# Patient Record
Sex: MALE | Race: WHITE | Employment: FULL TIME | URBAN - METROPOLITAN AREA
[De-identification: names, ages, dates, MRNs, and addresses within clinical notes are randomized per-mention and may not be internally consistent; named-entity substitution may affect disease eponyms.]

---

## 2017-08-21 RX ORDER — SIMVASTATIN 80 MG/1
TABLET, FILM COATED ORAL
Qty: 90 TAB | Refills: 2 | Status: SHIPPED | OUTPATIENT
Start: 2017-08-21 | End: 2018-04-03 | Stop reason: CLARIF

## 2017-12-20 PROBLEM — E78.2 MIXED HYPERLIPIDEMIA: Status: ACTIVE | Noted: 2017-12-20

## 2017-12-21 PROBLEM — I70.90 ASVD (ARTERIOSCLEROTIC VASCULAR DISEASE): Status: ACTIVE | Noted: 2017-12-21

## 2017-12-21 PROBLEM — R73.02 GLUCOSE INTOLERANCE (IMPAIRED GLUCOSE TOLERANCE): Status: ACTIVE | Noted: 2017-12-21

## 2017-12-21 PROBLEM — M19.90 DJD (DEGENERATIVE JOINT DISEASE): Status: ACTIVE | Noted: 2017-12-21

## 2017-12-21 PROBLEM — M13.0 ARTHRITIS OF MULTIPLE SITES: Status: ACTIVE | Noted: 2017-12-21

## 2017-12-21 PROBLEM — I10 HTN (HYPERTENSION): Status: ACTIVE | Noted: 2017-12-21

## 2017-12-21 PROBLEM — Z87.891 HISTORY OF TOBACCO ABUSE: Status: ACTIVE | Noted: 2017-12-21

## 2017-12-21 PROBLEM — C61 PROSTATE CARCINOMA (HCC): Status: ACTIVE | Noted: 2017-12-21

## 2017-12-21 PROBLEM — E78.5 HYPERLIPIDEMIA: Status: ACTIVE | Noted: 2017-12-21

## 2017-12-21 PROBLEM — N52.9 ED (ERECTILE DYSFUNCTION): Status: ACTIVE | Noted: 2017-12-21

## 2017-12-21 PROBLEM — Z79.899 ON LONG TERM DRUG THERAPY: Status: ACTIVE | Noted: 2017-12-21

## 2017-12-21 RX ORDER — EPINEPHRINE 0.3 MG/.3ML
0.3 INJECTION SUBCUTANEOUS
COMMUNITY
End: 2018-12-21 | Stop reason: ALTCHOICE

## 2017-12-21 RX ORDER — ASPIRIN 325 MG
325 TABLET ORAL DAILY
COMMUNITY
End: 2021-01-21 | Stop reason: ALTCHOICE

## 2017-12-21 RX ORDER — CILOSTAZOL 100 MG/1
TABLET ORAL
COMMUNITY
End: 2018-01-26 | Stop reason: SDUPTHER

## 2017-12-21 RX ORDER — TADALAFIL 20 MG/1
20 TABLET ORAL AS NEEDED
COMMUNITY
End: 2018-01-26 | Stop reason: SDUPTHER

## 2017-12-22 ENCOUNTER — OFFICE VISIT (OUTPATIENT)
Dept: INTERNAL MEDICINE CLINIC | Age: 67
End: 2017-12-22

## 2017-12-22 VITALS
BODY MASS INDEX: 25.53 KG/M2 | HEIGHT: 69 IN | TEMPERATURE: 97.7 F | OXYGEN SATURATION: 97 % | SYSTOLIC BLOOD PRESSURE: 120 MMHG | RESPIRATION RATE: 16 BRPM | WEIGHT: 172.4 LBS | DIASTOLIC BLOOD PRESSURE: 80 MMHG | HEART RATE: 67 BPM

## 2017-12-22 DIAGNOSIS — Z87.891 HISTORY OF TOBACCO ABUSE: ICD-10-CM

## 2017-12-22 DIAGNOSIS — C61 PROSTATE CARCINOMA (HCC): ICD-10-CM

## 2017-12-22 DIAGNOSIS — Z00.00 ANNUAL PHYSICAL EXAM: Primary | ICD-10-CM

## 2017-12-22 DIAGNOSIS — M54.41 CHRONIC RIGHT-SIDED LOW BACK PAIN WITH RIGHT-SIDED SCIATICA: ICD-10-CM

## 2017-12-22 DIAGNOSIS — E78.2 MIXED HYPERLIPIDEMIA: ICD-10-CM

## 2017-12-22 DIAGNOSIS — I10 ESSENTIAL HYPERTENSION: ICD-10-CM

## 2017-12-22 DIAGNOSIS — R73.02 GLUCOSE INTOLERANCE (IMPAIRED GLUCOSE TOLERANCE): ICD-10-CM

## 2017-12-22 DIAGNOSIS — M25.551 PAIN OF RIGHT HIP JOINT: ICD-10-CM

## 2017-12-22 DIAGNOSIS — M13.0 ARTHRITIS OF MULTIPLE SITES: ICD-10-CM

## 2017-12-22 DIAGNOSIS — G89.29 CHRONIC RIGHT-SIDED LOW BACK PAIN WITH RIGHT-SIDED SCIATICA: ICD-10-CM

## 2017-12-22 LAB
ALBUMIN SERPL-MCNC: 4.5 G/DL (ref 3.9–5.4)
ALKALINE PHOS POC: 98 U/L (ref 38–126)
ALT SERPL-CCNC: 63 U/L (ref 9–52)
AST SERPL-CCNC: 34 U/L (ref 14–36)
BACTERIA UA POCT, BACTPOCT: NORMAL
BILIRUB UR QL STRIP: NEGATIVE
BUN BLD-MCNC: 15 MG/DL (ref 9–20)
CALCIUM BLD-MCNC: 9.6 MG/DL (ref 8.4–10.2)
CASTS UA POCT: 0
CHLORIDE BLD-SCNC: 102 MMOL/L (ref 98–107)
CHOLEST SERPL-MCNC: 176 MG/DL (ref 0–200)
CK (CPK) POC: 94 U/L (ref 30–135)
CLUE CELLS, CLUEPOCT: NEGATIVE
CO2 POC: 26 MMOL/L (ref 22–32)
CREAT BLD-MCNC: 0.8 MG/DL (ref 0.8–1.5)
CRYSTALS UA POCT, CRYSPOCT: NEGATIVE
EGFR (POC): 93.7
EPITHELIAL CELLS POCT: NEGATIVE
GLUCOSE POC: 104 MG/DL (ref 75–110)
GLUCOSE UR-MCNC: NEGATIVE MG/DL
GRAN# POC: 8.6 K/UL (ref 2–7.8)
GRAN% POC: 75.2 % (ref 37–92)
HBA1C MFR BLD HPLC: 5.6 % (ref 4.5–5.7)
HCT VFR BLD CALC: 44.3 % (ref 37–51)
HDLC SERPL-MCNC: 61 MG/DL (ref 35–130)
HGB BLD-MCNC: 14.8 G/DL (ref 12–18)
KETONES P FAST UR STRIP-MCNC: NEGATIVE MG/DL
LDL CHOLESTEROL POC: 98.8 MG/DL (ref 0–130)
LY# POC: 2.2 K/UL (ref 0.6–4.1)
LY% POC: 20.4 % (ref 10–58.5)
MCH RBC QN: 30.9 PG (ref 26–32)
MCHC RBC-ENTMCNC: 33.4 G/DL (ref 30–36)
MCV RBC: 92 FL (ref 80–97)
MID #, POC: 0.4 K/UL (ref 0–1.8)
MID% POC: 4.4 % (ref 0.1–24)
MUCUS UA POCT, MUCPOCT: NORMAL
PH UR STRIP: 6 [PH] (ref 5–7)
PLATELET # BLD: 294 K/UL (ref 140–440)
POTASSIUM SERPL-SCNC: 4.4 MMOL/L (ref 3.6–5)
PROT SERPL-MCNC: 8.1 G/DL (ref 6.3–8.2)
PROT UR QL STRIP: NEGATIVE
PSA SERPL-MCNC: 0 NG/ML (ref 0–4)
RBC # BLD: 4.79 M/UL (ref 4.2–6.3)
RBC UA POCT, RBCPOCT: NORMAL
SODIUM SERPL-SCNC: 144 MMOL/L (ref 137–145)
SP GR UR STRIP: 1.02 (ref 1.01–1.02)
T4 FREE SERPL-MCNC: 1 NG/DL (ref 0.71–1.85)
TCHOL/HDL RATIO (POC): 2.9 (ref 0–4)
TOTAL BILIRUBIN POC: 1.2 MG/DL (ref 0.2–1.3)
TRICH UA POCT, TRICHPOC: NEGATIVE
TRIGL SERPL-MCNC: 81 MG/DL (ref 0–200)
TSH BLD-ACNC: 1.58 UIU/ML (ref 0.4–4.2)
UA UROBILINOGEN AMB POC: NORMAL (ref 0.2–1)
URINALYSIS CLARITY POC: CLEAR
URINALYSIS COLOR POC: NORMAL
URINE BLOOD POC: NEGATIVE
URINE CULT COMMENT, POCT: NORMAL
URINE LEUKOCYTES POC: NEGATIVE
URINE NITRITES POC: NEGATIVE
VLDLC SERPL CALC-MCNC: 16.2 MG/DL
WBC # BLD: 11.2 K/UL (ref 4.1–10.9)
WBC UA POCT, WBCPOCT: 0
YEAST UA POCT, YEASTPOC: NEGATIVE

## 2017-12-22 RX ORDER — PREDNISONE 10 MG/1
10 TABLET ORAL SEE ADMIN INSTRUCTIONS
Qty: 21 TAB | Refills: 0 | Status: SHIPPED | OUTPATIENT
Start: 2017-12-22 | End: 2018-06-29 | Stop reason: ALTCHOICE

## 2017-12-22 NOTE — PATIENT INSTRUCTIONS

## 2017-12-22 NOTE — PROGRESS NOTES
1. Have you been to the ER, urgent care clinic since your last visit? Hospitalized since your last visit? No    2. Have you seen or consulted any other health care providers outside of the [address] [place] [address] since your last visit? Include any pap smears or colon screening. No       Chief Complaint   Patient presents with    Hypertension     6 mo. f/u    Cholesterol Problem     6 mo. f/u       Fasting    Eye exam was done in October 2017 with Dr. Milla Franklin' office.

## 2017-12-22 NOTE — MR AVS SNAPSHOT
Visit Information Date & Time Provider Department Dept. Phone Encounter #  
 12/22/2017  8:10 AM Santosh Ruiz MD [place] [address] [phone] [id number] Follow-up Instructions Return in about 3 months (around 3/22/2018). Follow-up and Disposition History Your Appointments 6/22/2018  8:10 AM  
FOLLOW UP 10 with Santosh Ruiz MD  
[facility] ([address]) Appt Note: [address] [facility] [address] [phone] [address] [facility] [phone] Upcoming Health Maintenance Date Due DTaP/Tdap/Td series (1 - Tdap) 6/3/1973 ZOSTER VACCINE AGE 60> 4/3/2012 GLAUCOMA SCREENING Q2Y 6/3/2017 MEDICARE YEARLY EXAM 6/3/2017 COLONOSCOPY 9/3/2018 Allergies as of 12/22/2017  Review Complete On: 12/22/2017 By: Santosh Ruiz MD  
  
 Severity Noted Reaction Type Reactions Bee Sting [Sting, Bee]  01/27/2012   Intolerance Swelling Swelling (local area of sting) - per  [facility] in PAT Penicillins  01/27/2012    Unknown (comments) Current Immunizations  Never Reviewed Name Date Influenza High Dose Vaccine PF 9/15/2017 Influenza Vaccine 11/22/2016, 10/23/2015, 10/10/2014 Pneumococcal Conjugate (PCV-13) 6/13/2017 Not reviewed this visit You Were Diagnosed With   
  
 Codes Comments Annual physical exam    -  Primary ICD-10-CM: Z00.00 ICD-9-CM: V70.0 Prostate carcinoma [facility])     ICD-10-CM: V31 ICD-9-CM: 740 Essential hypertension     ICD-10-CM: I10 
ICD-9-CM: 401.9 Glucose intolerance (impaired glucose tolerance)     ICD-10-CM: R73.02 
ICD-9-CM: 790.22 History of tobacco abuse     ICD-10-CM: Z87.891 ICD-9-CM: V15.82 Mixed hyperlipidemia     ICD-10-CM: E78.2 ICD-9-CM: 272.2 Arthritis of multiple sites     ICD-10-CM: M13.0 ICD-9-CM: 716.99   
 Pain of right hip joint     ICD-10-CM: M25.551 ICD-9-CM: 719.45 Chronic right-sided low back pain with right-sided sciatica     ICD-10-CM: M54.41, G89.29 ICD-9-CM: 724.2, 724.3, 338.29 Vitals BP Pulse Temp Resp Height(growth percentile) Weight(growth percentile) 120/80 (BP 1 Location: Left arm, BP Patient Position: Sitting) 67 97.7 °F (36.5 °C) (Oral) 16 5' 8.5\" (1.74 m) 172 lb 6.4 oz (78.2 kg) SpO2 BMI Smoking Status 97% 25.83 kg/m2 Former Smoker Vitals History BMI and BSA Data Body Mass Index Body Surface Area  
 25.83 kg/m 2 1.94 m 2 Preferred Pharmacy Pharmacy Name Phone [facility] [address] [phone] Your Updated Medication List  
  
   
This list is accurate as of: 12/22/17  9:52 AM.  Always use your most recent med list.  
  
  
  
  
 aspirin 325 mg tablet Commonly known as:  ASPIRIN Take 325 mg by mouth daily. CIALIS 20 mg tablet Generic drug:  tadalafil Take 20 mg by mouth as needed. cilostazol 100 mg tablet Commonly known as:  PLETAL Take  by mouth Before breakfast and dinner. EPIPEN 2-IGNACIO 0.3 mg/0.3 mL injection Generic drug:  EPINEPHrine  
0.3 mg by IntraMUSCular route once as needed. Niaspan 500 mg tablet Generic drug:  niacin ER Take 500 mg by mouth nightly. predniSONE 10 mg dose pack Commonly known as:  STERAPRED DS Take 1 Tab by mouth See Admin Instructions. See administration instruction per 10mg dose pack * simvastatin 80 mg tablet Commonly known as:  ZOCOR  
TAKE 1 TABLET DAILY * simvastatin 40 mg tablet Commonly known as:  ZOCOR Take 80 mg by mouth. * Notice: This list has 2 medication(s) that are the same as other medications prescribed for you. Read the directions carefully, and ask your doctor or other care provider to review them with you. Prescriptions Sent to Pharmacy Refills  
 predniSONE (STERAPRED DS) 10 mg dose pack 0 Sig: Take 1 Tab by mouth See Admin Instructions. See administration instruction per 10mg dose pack Class: Normal  
 Pharmacy: [facility] [address] [place] [address] #: [phone] Route: Oral  
  
We Performed the Following AMB POC BLOOD OCCULT QUAL FECAL HEMGLBN 1-3 [id number] CPT(R)] Comments:  
 Hemoccult test done in office is negative AMB POC CK (CPK) [36791 CPT(R)] AMB POC COMPLETE CBC,AUTOMATED ENTER [id number] CPT(R)] AMB POC COMPREHENSIVE METABOLIC PANEL [12029 CPT(R)] AMB POC HEMOGLOBIN A1C [15351 CPT(R)] AMB POC LIPID PROFILE [77846 CPT(R)] AMB POC PSA [07114 CPT(R)] AMB POC T4, FREE [id number] CPT(R)] AMB POC TSH [87899 CPT(R)] AMB POC URINALYSIS DIP STICK AUTO W/ MICRO  [86428 CPT(R)] HEPATITIS C AB [09895 CPT(R)] Follow-up Instructions Return in about 3 months (around 3/22/2018). To-Do List   
 12/22/2017 Imaging:  XR HIP RT W OR WO PELV 2-3 VWS   
  
 12/22/2017 Imaging:  XR SPINE LUMB 2 OR 3 V Patient Instructions Back Pain: Care Instructions Your Care Instructions Back pain has many possible causes. It is often related to problems with muscles and ligaments of the back. It may also be related to problems with the nerves, discs, or bones of the back. Moving, lifting, standing, sitting, or sleeping in an awkward way can strain the back. Sometimes you don't notice the injury until later. Arthritis is another common cause of back pain. Although it may hurt a lot, back pain usually improves on its own within several weeks. Most people recover in 12 weeks or less. Using good home treatment and being careful not to stress your back can help you feel better sooner. Follow-up care is a key part of your treatment and safety.  Be sure to make and go to all appointments, and call your doctor if you are having problems. It's also a good idea to know your test results and keep a list of the medicines you take. How can you care for yourself at home? · Sit or lie in positions that are most comfortable and reduce your pain. Try one of these positions when you lie down: ¨ Lie on your back with your knees bent and supported by large pillows. ¨ Lie on the floor with your legs on the seat of a sofa or chair. Janice Francisco on your side with your knees and hips bent and a pillow between your legs. ¨ Lie on your stomach if it does not make pain worse. · Do not sit up in bed, and avoid soft couches and twisted positions. Bed rest can help relieve pain at first, but it delays healing. Avoid bed rest after the first day of back pain. · Change positions every 30 minutes. If you must sit for long periods of time, take breaks from sitting. Get up and walk around, or lie in a comfortable position. · Try using a heating pad on a low or medium setting for 15 to 20 minutes every 2 or 3 hours. Try a warm shower in place of one session with the heating pad. · You can also try an ice pack for 10 to 15 minutes every 2 to 3 hours. Put a thin cloth between the ice pack and your skin. · Take pain medicines exactly as directed. ¨ If the doctor gave you a prescription medicine for pain, take it as prescribed. ¨ If you are not taking a prescription pain medicine, ask your doctor if you can take an over-the-counter medicine. · Take short walks several times a day. You can start with 5 to 10 minutes, 3 or 4 times a day, and work up to longer walks. Walk on level surfaces and avoid hills and stairs until your back is better. · Return to work and other activities as soon as you can. Continued rest without activity is usually not good for your back.  
· To prevent future back pain, do exercises to stretch and strengthen your back and stomach. Learn how to use good posture, safe lifting techniques, and proper body mechanics. When should you call for help? Call your doctor now or seek immediate medical care if: 
? · You have new or worsening numbness in your legs. ? · You have new or worsening weakness in your legs. (This could make it hard to stand up.) ? · You lose control of your bladder or bowels. ? Watch closely for changes in your health, and be sure to contact your doctor if: 
? · Your pain gets worse. ? · You are not getting better after 2 weeks. Where can you learn more? Go to http://meghan-nicole.info/. Enter G638 in the search box to learn more about \"Back Pain: Care Instructions. \" Current as of: March 21, 2017 Content Version: 11.4 © 2888-3253 [employer]. Care instructions adapted under license by [employer] (which disclaims liability or warranty for this information). If you have questions about a medical condition or this instruction, always ask your healthcare professional. [place] [address] any warranty or liability for your use of this information. Patient Instructions History Introducing [facility]! Karen Sharma introduces [employer] patient portal. Now you can access parts of your medical record, email your doctor's office, and request medication refills online. 1. In your internet browser, go to [email or web address]. [employer] [facility]/[employer] 2. Click on the First Time User? Click Here link in the Sign In box. You will see the New Member Sign Up page. 3. Enter your [employer] Access Code exactly as it appears below. You will not need to use this code after youve completed the sign-up process. If you do not sign up before the expiration date, you must request a new code. · [employer] Access Code: [email or web address] Expires: 3/22/2018  7:55 AM 
 
4.  Enter the last four digits of your Social Security Number (xxxx) and Date of Birth (mm/dd/yyyy) as indicated and click Submit. You will be taken to the next sign-up page. 5. Create a [employer] ID. This will be your [employer] login ID and cannot be changed, so think of one that is secure and easy to remember. 6. Create a [employer] password. You can change your password at any time. 7. Enter your Password Reset Question and Answer. This can be used at a later time if you forget your password. 8. Enter your e-mail address. You will receive e-mail notification when new information is available in [address] 9. Click Sign Up. You can now view and download portions of your medical record. 10. Click the Download Summary menu link to download a portable copy of your medical information. If you have questions, please visit the Frequently Asked Questions section of the [employer] website. Remember, [employer] is NOT to be used for urgent needs. For medical emergencies, dial 911. Now available from your iPhone and Android! Please provide this summary of care documentation to your next provider. Your primary care clinician is listed as [place]. If you have any questions after today's visit, please call [phone].

## 2017-12-22 NOTE — PROGRESS NOTES
HPI:   74 y.o.  presents for comprehensive annual healthcare examination and follow up appointment. No hospital, ER or specialist visits since last primary care visit except as noted below. He is normally followed for hypertension, glucose intolerance, hyperlipidemia, prostate cancer, DJD, and a history of tobacco abuse but he has been smoke-free now for 5 years. He currently denies any chest pain shortness breath or cardiorespiratory commands. There are no GI/ complaints. He has no headaches or neurologic complaints. He does have some pain in the right hip does seem to come from his right side of his back. It gets worse when he sits for long time. There is no history of trauma. He denies any other arthritic complaints. There are no other complaints on complete review of systems. He is taking his medications trying to follow his diet and trying to maintain physical activity with exercise.     Patient Active Problem List    Diagnosis    Annual physical exam    ED (erectile dysfunction)    History of tobacco abuse    [facility]    On long term drug therapy    Essential hypertension    Glucose intolerance (impaired glucose tolerance)    ASVD (arteriosclerotic vascular disease)    Arthritis of multiple sites    Mixed hyperlipidemia       Past Medical History:   Diagnosis Date    Arthritis of multiple sites 12/21/2017    ASVD (arteriosclerotic vascular disease) 12/21/2017    Cancer ([address]     prostate    DJD (degenerative joint disease) 12/21/2017    ED (erectile dysfunction) 12/21/2017    Glucose intolerance (impaired glucose tolerance) 12/21/2017    History of tobacco abuse 12/21/2017    HTN (hypertension) 12/21/2017    Hypercholesterolemia     Hyperlipidemia 12/21/2017    On long term drug therapy 12/21/2017    PAD (peripheral artery disease) ([address]     left leg    Prostate carcinoma ([address] 12/21/2017       Social History   Substance Use Topics    Smoking status: Former Smoker     Packs/day: 1.00     Years: 37.1     Quit date: 2012    Smokeless tobacco: Never Used    Alcohol use 3.0 oz/week     6 Cans of beer per week       Family History   Problem Relation Age of Onset    Diabetes Mother     Alcohol abuse Father        Outpatient Prescriptions Marked as Taking for the 12/22/17 encounter (Office Visit) with Ericka Daniel MD   Medication Sig Dispense Refill    predniSONE (STERAPRED DS) 10 mg dose pack Take 1 Tab by mouth See Admin Instructions. See administration instruction per 10mg dose pack 21 Tab 0    cilostazol (PLETAL) 100 mg tablet Take  by mouth Before breakfast and dinner.  aspirin (ASPIRIN) 325 mg tablet Take 325 mg by mouth daily.  tadalafil (CIALIS) 20 mg tablet Take 20 mg by mouth as needed.  EPINEPHrine (EPIPEN 2-IGNACIO) 0.3 mg/0.3 mL injection 0.3 mg by IntraMUSCular route once as needed.  simvastatin (ZOCOR) 80 mg tablet TAKE 1 TABLET DAILY 90 Tab 2    niacin ER (NIASPAN) 500 mg tablet Take 500 mg by mouth nightly.  simvastatin (ZOCOR) 40 mg tablet Take 80 mg by mouth. Allergies   Allergen Reactions    Bee Sting [Sting, Bee] Swelling     Swelling (local area of sting) - per RN Cinderella Coad in PAT    Penicillins Unknown (comments)       ROS:     Constitutional: He denies fevers, weight loss, sweats. Eyes: No blurred or double vision. ENT: No difficulty with swallowing, taste, speech or smell. Neck: no stiffness or swelling  Respiratory: No cough wheezing or shortness of breath. Cardiovascular: Denies chest pain, palpitations, unexplained indigestion or syncope. Gastrointestinal:  No changes in bowel movements, no abdominal pain, no bloating. Genitourinary:  He denies frequency, nocturia or stranguria. Extremities: No joint pain except right hip which she thinks is coming from his back,no stiffness or swelling. Neurological:  No numbness, tingling, burring paresthesias or loss of motor strength.   No syncope, dizziness or frequent headache  Lymphatic: no adenopathy noted  Hematologic: no easy bruising or bleeding gums  Skin:  No recent rashes or mole changes. Psychiatric/Behavioral:  Negative for depression. The patient is not nervous/anxious. PE:     Vitals:    12/22/17 0757   BP: 120/80   Pulse: 67   Resp: 16   Temp: 97.7 °F (36.5 °C)   TempSrc: Oral   SpO2: 97%   Weight: 172 lb 6.4 oz (78.2 kg)   Height: 5' 8.5\" (1.74 m)   PainSc:   0 - No pain        General appearance - alert, well appearing, and in no distress  Mental status - alert, oriented to person, place, and time  HEENT:  Ears - bilateral TM's and external ear canals clear  Eyes - pupillary responses were normal.  Extraocular muscle function intact. Lids and conjunctiva not injected. Fundoscopic exam revealed sharp disc margins. eye movements intact  Pharynx- clear with teeth in good repair. No masses were noted  Neck - supple without thyromegaly or burit. No JVD noted  Lungs - clear to auscultation and percussion  Cardiac- normal rate, regular rhythm without murmurs. PMI not displaced. No gallop, rub or click  Abdomen - flat, soft, non-tender without palpable organomegaly or mass. No pulsatile mass was felt, and not bruit was heard. Bowel sounds were active  : Circumcised, Testes descended w/o masses  Rectal: normal sphincter tone, prostate normal, no masses, stool brown and hemacult negative  Extremities -  no clubbing cyanosis or edema. Mild tenderness right hip to palpation in the sciatic region but range of motion is normal  Lymphatics - no palpable lymphadenopathy, no hepatosplenomegaly  Hematologic: no petechiae or purpura  Peripheral vascular -Femoral, Dorsalis pedis and posterior tibial pulses felt without difficulty  Skin - no rash or unusual mole change noted  Neurological - Cranial nerves II-XII grossly intact. Motor strength 5/5. DTR's 2+ and symmetric.   Station and gait normal  Back exam - full range of motion, no tenderness, palpable spasm or pain on motion  Musculoskeletal - no joint tenderness, deformity or swelling      Assessment/Plan:     1. Annual physical exam    2. Prostate carcinoma ([address]    3. Essential hypertension    4. Glucose intolerance (impaired glucose tolerance)    5. History of tobacco abuse    6. Mixed hyperlipidemia    7. Arthritis of multiple sites    8. Pain of right hip joint    9. Chronic right-sided low back pain with right-sided sciatica        Impression  1. Annual physical examination normal except for the right hip and back discomfort  2. History of prostate carcinoma no see evidence of that in the x-rays we will check his PSA today  3. Hypertension that is controlled continue current therapy reviewed with him  4. Glucose intolerance repeat status pending I reviewed his prior labs with him  5. History of tobacco abuse she is now been 5 years smoke free  6. I hyperlipidemia that status is pending will make adjustments if necessary prior labs reviewed with him  7. DJD seems to be stable except for the right hip and low back discomfort x-ray of the right hip show some arthritis x-ray of the lumbar spine appears to be normal we will try Sterapred 10 mg six-day Dosepak and see if that will help with a sciatica. Immunizations up-to-date. Follow-up scheduled again for 3 months or sooner should there be a problem. We will call the lab results    Health Maintenance reviewed - updated.     Orders Placed This Encounter    XR HIP RT W OR WO PELV 2-3 VWS    XR SPINE LUMB 2 OR 3 V    HEPATITIS C AB    AMB POC HEMOGLOBIN A1C    AMB POC LIPID PROFILE    AMB POC T4, FREE    AMB POC URINALYSIS DIP STICK AUTO W/ MICRO     AMB POC TSH    AMB POC COMPLETE CBC,AUTOMATED ENTER    AMB POC CK (CPK)    AMB POC COMPREHENSIVE METABOLIC PANEL    AMB POC PSA    AMB POC BLOOD OCCULT QUAL FECAL HEMGLBN 1-3    predniSONE (STERAPRED DS) 10 mg dose pack       Medications Discontinued During This Encounter   Medication Reason    oxyCODONE-acetaminophen (PERCOCET) 7.5-325 mg per tablet Alternate Therapy       Current Outpatient Prescriptions   Medication Sig Dispense Refill    predniSONE (STERAPRED DS) 10 mg dose pack Take 1 Tab by mouth See Admin Instructions. See administration instruction per 10mg dose pack 21 Tab 0    cilostazol (PLETAL) 100 mg tablet Take  by mouth Before breakfast and dinner.  aspirin (ASPIRIN) 325 mg tablet Take 325 mg by mouth daily.  tadalafil (CIALIS) 20 mg tablet Take 20 mg by mouth as needed.  EPINEPHrine (EPIPEN 2-IGNACIO) 0.3 mg/0.3 mL injection 0.3 mg by IntraMUSCular route once as needed.  simvastatin (ZOCOR) 80 mg tablet TAKE 1 TABLET DAILY 90 Tab 2    niacin ER (NIASPAN) 500 mg tablet Take 500 mg by mouth nightly.  simvastatin (ZOCOR) 40 mg tablet Take 80 mg by mouth.          Results for orders placed or performed in visit on 12/22/17   AMB POC HEMOGLOBIN A1C   Result Value Ref Range    Hemoglobin A1c (POC)  4.5 - 5.7 %   AMB POC LIPID PROFILE   Result Value Ref Range    Cholesterol (POC)  0 - 200 mg/dL    Triglycerides (POC)  0 - 200 mg/dL    HDL Cholesterol (POC)  35 - 130 mg/dL    VLDL (POC)  MG/DL    LDL Cholesterol (POC)  0.0 - 130.0 MG/DL    TChol/HDL Ratio (POC)  0.0 - 4.0   AMB POC T4, FREE   Result Value Ref Range    FREE T4 (POC)  0.71 - 1.85 ng/dL   AMB POC URINALYSIS DIP STICK AUTO W/ MICRO    Result Value Ref Range    Color (UA POC)      Clarity (UA POC)      Glucose (UA POC)  Negative    Bilirubin (UA POC)  Negative    Ketones (UA POC)  Negative    Specific gravity (UA POC)  1.010 - 1.025    Blood (UA POC)  Negative    pH (UA POC)  5.0 - 7.0    Protein (UA POC)  Negative    Urobilinogen (UA POC)  0.2 - 1    Nitrites (UA POC)  Negative    Leukocyte esterase (UA POC)  Negative    Epithelial cells (UA POC)      Mucus (UA POC)      WBCs (UA POC)      RBCs (UA POC)      Casts (UA POC)  Negative    Crystals (UA POC)  Negative    Clue Cells (UA POC)      Trichomonas (UA POC) Yeast (UA POC)      Bacteria (UA POC)  Negative    URINE CULT COMMENT (UA POC)     AMB POC TSH   Result Value Ref Range    TSH POC  0.40 - 4.20 uIU/ml   AMB POC COMPLETE CBC,AUTOMATED ENTER   Result Value Ref Range    WBC (POC)  4.1 - 10.9 K/uL    RBC (POC)  4.20 - 6.30 M/uL    HGB (POC)  12.0 - 18.0 g/dL    HCT (POC)  37.0 - 51.0 %    MCV (POC)  80.0 - 97.0 fL    MCH (POC)  26.0 - 32.0 pg    MCHC (POC)  30.0 - 36.0 g/dL    PLATELET (POC)  519.1 - 440.0 K/uL    ABS. LYMPHS (POC)  0.6 - 4.1 K/uL    LYMPHOCYTES (POC)  10.0 - 58.5 %    Mid # (POC)  0.0 - 1.8 K/uL    MID% POC  0.1 - 24.0 %    ABS. GRANS (POC)  2.0 - 7.8 K/uL    GRANULOCYTES (POC)  37.0 - 92.0 %   AMB POC CK (CPK)   Result Value Ref Range    CK (CPK) (POC)  30 - 135 U/L   AMB POC COMPREHENSIVE METABOLIC PANEL   Result Value Ref Range    GLUCOSE  75 - 110 mg/dL    BUN  9 - 20 mg/dL    Creatinine (POC)  0.8 - 1.5 mg/dL    Sodium (POC)  137 - 145 MMOL/L    Potassium (POC)  3.6 - 5.0 MMOL/L    CHLORIDE  98 - 107 MMOL/L    CO2  22 - 32 MMOL/L    CALCIUM  8.4 - 10.2 mg/dL    TOTAL PROTEIN  6.3 - 8.2 g/dL    ALBUMIN  3.9 - 5.4 g/dL    AST (POC)  14 - 36 U/L    ALT (POC)  9 - 52 U/L    ALKALINE PHOS  38 - 126 U/L    TOTAL BILIRUBIN  0.2 - 1.3 mg/dL    eGFR (POC)     AMB POC PSA   Result Value Ref Range    PSA (POC)  0.0 - 4.0 ng/mL       Recommended healthy diet low in carbohydrates, fats, sodium and cholesterol. Recommended regular cardiovascular exercise 3-6 times per week for 30-60 minutes daily. Verbal and written instructions (see AVS) provided. Patient expresses understanding of diagnosis and treatment plan.       Lloyd Soler MD

## 2017-12-23 LAB — HCV AB S/CO SERPL IA: <0.1 S/CO RATIO (ref 0–0.9)

## 2018-01-26 RX ORDER — TADALAFIL 20 MG/1
20 TABLET ORAL AS NEEDED
Qty: 10 TAB | Status: SHIPPED | OUTPATIENT
Start: 2018-01-26 | End: 2019-07-05 | Stop reason: SDUPTHER

## 2018-01-26 RX ORDER — CILOSTAZOL 100 MG/1
100 TABLET ORAL
Qty: 180 TAB | Status: SHIPPED | OUTPATIENT
Start: 2018-01-26 | End: 2019-07-05 | Stop reason: SDUPTHER

## 2018-01-26 NOTE — TELEPHONE ENCOUNTER
Requested Prescriptions     Pending Prescriptions Disp Refills    cilostazol (PLETAL) 100 mg tablet       Sig: Take  by mouth Before breakfast and dinner.  tadalafil (CIALIS) 20 mg tablet       Sig: Take 1 Tab by mouth as needed.      cilostazol #180, 90 days  Cialis #24    Upcoming visit:  06/22/2018  Next visit:           12/22/2017

## 2018-04-03 DIAGNOSIS — E78.2 MIXED HYPERLIPIDEMIA: Primary | ICD-10-CM

## 2018-04-03 RX ORDER — SIMVASTATIN 80 MG/1
TABLET, FILM COATED ORAL
Qty: 90 TAB | Refills: 3 | Status: SHIPPED | OUTPATIENT
Start: 2018-04-03 | End: 2019-07-05 | Stop reason: SDUPTHER

## 2018-04-03 NOTE — TELEPHONE ENCOUNTER
RX refill request from the patient/pharmacy. Patient last seen 12- with labs, and next appt. scheduled for 06-.

## 2018-06-28 PROBLEM — M15.9 PRIMARY OSTEOARTHRITIS INVOLVING MULTIPLE JOINTS: Status: ACTIVE | Noted: 2018-06-28

## 2018-06-29 ENCOUNTER — OFFICE VISIT (OUTPATIENT)
Dept: INTERNAL MEDICINE CLINIC | Age: 68
End: 2018-06-29

## 2018-06-29 VITALS
SYSTOLIC BLOOD PRESSURE: 122 MMHG | BODY MASS INDEX: 25.89 KG/M2 | HEIGHT: 69 IN | WEIGHT: 174.8 LBS | DIASTOLIC BLOOD PRESSURE: 80 MMHG | RESPIRATION RATE: 17 BRPM | OXYGEN SATURATION: 97 % | HEART RATE: 80 BPM

## 2018-06-29 DIAGNOSIS — E78.2 MIXED HYPERLIPIDEMIA: ICD-10-CM

## 2018-06-29 DIAGNOSIS — C61 PROSTATE CARCINOMA (HCC): ICD-10-CM

## 2018-06-29 DIAGNOSIS — Z23 ENCOUNTER FOR IMMUNIZATION: ICD-10-CM

## 2018-06-29 DIAGNOSIS — M15.9 PRIMARY OSTEOARTHRITIS INVOLVING MULTIPLE JOINTS: ICD-10-CM

## 2018-06-29 DIAGNOSIS — R73.02 GLUCOSE INTOLERANCE (IMPAIRED GLUCOSE TOLERANCE): ICD-10-CM

## 2018-06-29 DIAGNOSIS — I10 ESSENTIAL HYPERTENSION: Primary | ICD-10-CM

## 2018-06-29 NOTE — MR AVS SNAPSHOT
[address] [facility] 70 [address] [id number] [phone] Patient: Ava Borrero MRN: [id number] OUL:[birth year] Visit Information Date & Time Provider Department Dept. Phone Encounter #  
 6/29/2018 11:00 AM Shamar Peter, [address] [facility] [phone] [id number] Follow-up Instructions Return in about 6 months (around 12/29/2018). Your Appointments 12/21/2018  8:50 AM  
FOLLOW UP 10 with MD KASSANDRA Choudhury [facility] ([address]) Appt Note: 166 [facility] [address] [phone] 377 [facility] [id number] Upcoming Health Maintenance Date Due DTaP/Tdap/Td series (1 - Tdap) 6/3/1973 ZOSTER VACCINE AGE 60> 4/3/2012 GLAUCOMA SCREENING Q2Y 6/3/2017 COLONOSCOPY 9/3/2018 Influenza Age 5 to Adult 8/1/2018 Allergies as of 6/29/2018  Review Complete On: 6/29/2018 By: Shamar Peter MD  
  
 Severity Noted Reaction Type Reactions Bee Sting [Sting, Bee]  01/27/2012   Intolerance Swelling Swelling (local area of sting) - per COLT Franks in PAT Penicillins  01/27/2012    Unknown (comments) Current Immunizations  Never Reviewed Name Date Influenza High Dose Vaccine PF 9/15/2017 Influenza Vaccine 11/22/2016, 10/23/2015, 10/10/2014 Pneumococcal Conjugate (PCV-13) 6/13/2017 Pneumococcal Polysaccharide (PPSV-23)  Incomplete Not reviewed this visit You Were Diagnosed With   
  
 Codes Comments Essential hypertension    -  Primary ICD-10-CM: I10 
ICD-9-CM: 401.9 Glucose intolerance (impaired glucose tolerance)     ICD-10-CM: R73.02 
ICD-9-CM: 790.22 Mixed hyperlipidemia     ICD-10-CM: E78.2 ICD-9-CM: 272.2 Primary osteoarthritis involving multiple joints     ICD-10-CM: M15.0 ICD-9-CM: 715.09   
 Prostate carcinoma [facility])     ICD-10-CM: V21 ICD-9-CM: 012 Encounter for immunization     ICD-10-CM: X89 ICD-9-CM: V03.89 Vitals BP Pulse Resp Height(growth percentile) Weight(growth percentile) SpO2  
 122/80 (BP 1 Location: Left arm, BP Patient Position: Sitting) 80 17 5' 8.5\" (1.74 m) 174 lb 12.8 oz (79.3 kg) 97% BMI Smoking Status 26.19 kg/m2 Former Smoker Vitals History BMI and BSA Data Body Mass Index Body Surface Area  
 26.19 kg/m 2 1.96 m 2 Preferred Pharmacy Pharmacy Name Phone [phone] [facility], [address] [place] [address] DRIVE [phone] Your Updated Medication List  
  
   
This list is accurate as of 6/29/18 11:48 AM.  Always use your most recent med list.  
  
  
  
  
 aspirin 325 mg tablet Commonly known as:  ASPIRIN Take 325 mg by mouth daily. cilostazol 100 mg tablet Commonly known as:  PLETAL Take 1 Tab by mouth Before breakfast and dinner. EPIPEN 2-IGNACIO 0.3 mg/0.3 mL injection Generic drug:  EPINEPHrine  
0.3 mg by IntraMUSCular route once as needed. Niaspan 500 mg tablet Generic drug:  niacin ER Take 500 mg by mouth nightly. simvastatin 80 mg tablet Commonly known as:  ZOCOR  
TAKE 1 TABLET DAILY  
  
 tadalafil 20 mg tablet Commonly known as:  CIALIS Take 1 Tab by mouth as needed. We Performed the Following PNEUMOCOCCAL POLYSACCHARIDE VACCINE, 23-VALENT, ADULT OR IMMUNOSUPPRESSED PT DOSE, [81076 CPT(R)] CA IMMUNIZ ADMIN,1 SINGLE/COMB VAC/TOXOID [id number] CPT(R)] Follow-up Instructions Return in about 6 months (around 12/29/2018). Patient Instructions DASH Diet: Care Instructions Your Care Instructions The DASH diet is an eating plan that can help lower your blood pressure. DASH stands for Dietary Approaches to Stop Hypertension. Hypertension is high blood pressure. The DASH diet focuses on eating foods that are high in calcium, potassium, and magnesium. These nutrients can lower blood pressure. The foods that are highest in these nutrients are fruits, vegetables, low-fat dairy products, nuts, seeds, and legumes. But taking calcium, potassium, and magnesium supplements instead of eating foods that are high in those nutrients does not have the same effect. The DASH diet also includes whole grains, fish, and poultry. The DASH diet is one of several lifestyle changes your doctor may recommend to lower your high blood pressure. Your doctor may also want you to decrease the amount of sodium in your diet. Lowering sodium while following the DASH diet can lower blood pressure even further than just the DASH diet alone. Follow-up care is a key part of your treatment and safety. Be sure to make and go to all appointments, and call your doctor if you are having problems. It's also a good idea to know your test results and keep a list of the medicines you take. How can you care for yourself at home? Following the DASH diet · Eat 4 to 5 servings of fruit each day. A serving is 1 medium-sized piece of fruit, ½ cup chopped or canned fruit, 1/4 cup dried fruit, or 4 ounces (½ cup) of fruit juice. Choose fruit more often than fruit juice. · Eat 4 to 5 servings of vegetables each day. A serving is 1 cup of lettuce or raw leafy vegetables, ½ cup of chopped or cooked vegetables, or 4 ounces (½ cup) of vegetable juice. Choose vegetables more often than vegetable juice. · Get 2 to 3 servings of low-fat and fat-free dairy each day. A serving is 8 ounces of milk, 1 cup of yogurt, or 1 ½ ounces of cheese. · Eat 6 to 8 servings of grains each day. A serving is 1 slice of bread, 1 ounce of dry cereal, or ½ cup of cooked rice, pasta, or cooked cereal. Try to choose whole-grain products as much as possible. · Limit lean meat, poultry, and fish to 2 servings each day.  A serving is 3 ounces, about the size of a deck of cards. · Eat 4 to 5 servings of nuts, seeds, and legumes (cooked dried beans, lentils, and split peas) each week. A serving is 1/3 cup of nuts, 2 tablespoons of seeds, or ½ cup of cooked beans or peas. · Limit fats and oils to 2 to 3 servings each day. A serving is 1 teaspoon of vegetable oil or 2 tablespoons of salad dressing. · Limit sweets and added sugars to 5 servings or less a week. A serving is 1 tablespoon jelly or jam, ½ cup sorbet, or 1 cup of lemonade. · Eat less than 2,300 milligrams (mg) of sodium a day. If you limit your sodium to 1,500 mg a day, you can lower your blood pressure even more. Tips for success · Start small. Do not try to make dramatic changes to your diet all at once. You might feel that you are missing out on your favorite foods and then be more likely to not follow the plan. Make small changes, and stick with them. Once those changes become habit, add a few more changes. · Try some of the following: ¨ Make it a goal to eat a fruit or vegetable at every meal and at snacks. This will make it easy to get the recommended amount of fruits and vegetables each day. ¨ Try yogurt topped with fruit and nuts for a snack or healthy dessert. ¨ Add lettuce, tomato, cucumber, and onion to sandwiches. ¨ Combine a ready-made pizza crust with low-fat mozzarella cheese and lots of vegetable toppings. Try using tomatoes, squash, spinach, broccoli, carrots, cauliflower, and onions. ¨ Have a variety of cut-up vegetables with a low-fat dip as an appetizer instead of chips and dip. ¨ Sprinkle sunflower seeds or chopped almonds over salads. Or try adding chopped walnuts or almonds to cooked vegetables. ¨ Try some vegetarian meals using beans and peas. Add garbanzo or kidney beans to salads. Make burritos and tacos with mashed borrego beans or black beans. Where can you learn more? Go to http://christianson-nicole.info/. Enter M081 in the search box to learn more about \"DASH Diet: Care Instructions. \" Current as of: September 21, 2016 Content Version: 11.4 © 9152-0942 Healthwise, [employer]. Care instructions adapted under license by [employer] (which disclaims liability or warranty for this information). If you have questions about a medical condition or this instruction, always ask your healthcare professional. [address] any warranty or liability for your use of this information. Introducing [facility]! Lalita Mcgrath introduces [employer] patient portal. Now you can access parts of your medical record, email your doctor's office, and request medication refills online. 1. In your internet browser, go to [email or web address] 2. Click on the First Time User? Click Here link in the Sign In box. You will see the New Member Sign Up page. 3. Enter your [employer] Access Code exactly as it appears below. You will not need to use this code after youve completed the sign-up process. If you do not sign up before the expiration date, you must request a new code. · [employer] Access Code: [email or web address] Expires: 9/27/2018 11:48 AM 
 
4. Enter the last four digits of your Social Security Number (xxxx) and Date of Birth (mm/dd/yyyy) as indicated and click Submit. You will be taken to the next sign-up page. 5. Create a [employer] ID. This will be your [employer] login ID and cannot be changed, so think of one that is secure and easy to remember. 6. Create a [employer] password. You can change your password at any time. 7. Enter your Password Reset Question and Answer. This can be used at a later time if you forget your password. 8. Enter your e-mail address. You will receive e-mail notification when new information is available in [address] 9. Click Sign Up. You can now view and download portions of your medical record. 10. Click the Download Summary menu link to download a portable copy of your medical information. If you have questions, please visit the Frequently Asked Questions section of the [employer] website. Remember, [employer] is NOT to be used for urgent needs. For medical emergencies, dial 911. Now available from your iPhone and Android! Please provide this summary of care documentation to your next provider. Your primary care clinician is listed as [place]. If you have any questions after today's visit, please call [phone].

## 2018-06-29 NOTE — PROGRESS NOTES
Indu Guzman is a 79 y.o. male who presents for routine immunizations. He denies any symptoms , reactions or allergies that would exclude them from being immunized today. Risks and adverse reactions were discussed and the VIS was given to them. All questions were addressed. He was observed for 15 min post injection. There were no reactions observed.     Mariano Sánchez LPN

## 2018-06-29 NOTE — PROGRESS NOTES
Chief Complaint   Patient presents with    Hypertension     6 month follow up        SUBJECTIVE:    Shawn Correa is a 79 y.o. male who returns in follow-up for his hypertension, hyperlipidemia, glucose intolerance, DJD, and prostate cancer status post prostatectomy. He is taking his medications and trying to follow his diet and trying to get some exercise. He currently denies any chest pain, shortness breath, palpitations or cardiorespiratory commands. He denies any GI or  complaints. He denies any headaches, dizziness or neurologic complaints. He has no current arthritic complaints and there are no other complaints on complete review of systems. Current Outpatient Prescriptions   Medication Sig Dispense Refill    simvastatin (ZOCOR) 80 mg tablet TAKE 1 TABLET DAILY 90 Tab 3    cilostazol (PLETAL) 100 mg tablet Take 1 Tab by mouth Before breakfast and dinner. 180 Tab prn    tadalafil (CIALIS) 20 mg tablet Take 1 Tab by mouth as needed. 10 Tab prn    aspirin (ASPIRIN) 325 mg tablet Take 325 mg by mouth daily.  EPINEPHrine (EPIPEN 2-IGNACIO) 0.3 mg/0.3 mL injection 0.3 mg by IntraMUSCular route once as needed.  niacin ER (NIASPAN) 500 mg tablet Take 500 mg by mouth nightly.        Past Medical History:   Diagnosis Date    Arthritis of multiple sites 12/21/2017    ASVD (arteriosclerotic vascular disease) 12/21/2017    Cancer ([facility] [address]     prostate    DJD (degenerative joint disease) 12/21/2017    ED (erectile dysfunction) 12/21/2017    Glucose intolerance (impaired glucose tolerance) 12/21/2017    History of tobacco abuse 12/21/2017    HTN (hypertension) 12/21/2017    Hypercholesterolemia     Hyperlipidemia 12/21/2017    On long term drug therapy 12/21/2017    PAD (peripheral artery disease) (HCC)     left leg    Prostate carcinoma ([facility] [address] 12/21/2017     Past Surgical History:   Procedure Laterality Date    HX GI      HX HEMORRHOIDECTOMY      VASCULAR SURGERY PROCEDURE UNLIST left leg stent     Allergies   Allergen Reactions    Bee Sting [Sting, Bee] Swelling     Swelling (local area of sting) - per COLT Wolfe in PAT    Penicillins Unknown (comments)       REVIEW OF SYSTEMS:  General: negative for - chills or fever, or weight loss or gain  ENT: negative for - headaches, nasal congestion or tinnitus  Eyes: no blurred or visual changes  Neck: No stiffness or swollen nodes  Respiratory: negative for - cough, hemoptysis, shortness of breath or wheezing  Cardiovascular : negative for - chest pain, edema, palpitations or shortness of breath  Gastrointestinal: negative for - abdominal pain, blood in stools, heartburn or nausea/vomiting  Genito-Urinary: no dysuria, trouble voiding, or hematuria  Musculoskeletal: negative for - gait disturbance, joint pain, joint stiffness or joint swelling  Neurological: no TIA or stroke symptoms  Hematologic: no bruises, no bleeding  Lymphatic: no swollen glands  Integument: no lumps, mole changes, nail changes or rash  Endocrine:no malaise/lethargy poly uria or polydipsia or unexpected weight changes        Social History     Social History    Marital status:      Spouse name: N/A    Number of children: N/A    Years of education: N/A     Social History Main Topics    Smoking status: Former Smoker     Packs/day: 1.00     Years: 45.00     Quit date: 2012    Smokeless tobacco: Never Used    Alcohol use 3.0 oz/week     6 Cans of beer per week    Drug use: No    Sexual activity: Yes     Partners: Female     Birth control/ protection: Surgical     Other Topics Concern    None     Social History Narrative     Family History   Problem Relation Age of Onset    Diabetes Mother     Alcohol abuse Father        OBJECTIVE:     Visit Vitals    /80 (BP 1 Location: Left arm, BP Patient Position: Sitting)    Pulse 80    Resp 17    Ht 5' 8.5\" (1.74 m)    Wt 174 lb 12.8 oz (79.3 kg)    SpO2 97%    BMI 26.19 kg/m2     CONSTITUTIONAL:   well nourished, appears age appropriate  EYES: sclera anicteric, PERRL, EOMI  ENMT:nares clear, moist mucous membranes, pharynx clear  NECK: supple. Thyroid normal, No JVD or bruits  RESPIRATORY: Chest: clear to ascultation and percussion, normal inspiratory effort  CARDIOVASCULAR: Heart: regular rate and rhythm no murmurs, rubs or gallops, PMI not displaced, No thrills  GASTROINTESTINAL: Abdomen: non distended, soft, non tender, bowel sounds normal  HEMATOLOGIC: no purpura, petechiae or bruising  LYMPHATIC: No lymph node enlargemant  MUSCULOSKELETAL: Extremities: no edema or active synovitis, pulse 1+   INTEGUMENT: No unusual rashes or suspicious skin lesions noted. Nails appear normal.  PERIPHERAL VASCULAR: normal pulses femoral, PT and DP  NEUROLOGIC: non-focal exam, A & O X 3  PSYCHIATRIC:, appropriate affect     ASSESSMENT:   1. Essential hypertension    2. Glucose intolerance (impaired glucose tolerance)    3. Mixed hyperlipidemia    4. Primary osteoarthritis involving multiple joints    5. Prostate carcinoma ([facility] [address]    6. Encounter for immunization      Impression  1. Hypertension that is controlled continue current therapy reviewed with him  2.  Glucose intolerance repeat status pending reviewed prior labs will make adjustments if necessary  3. Hyperlipidemia prior labs reviewed repeat status pending will adjust if needed  04 DJD that is stable  5. Prostate carcinoma status post prostatectomy stable  Immunization update today with pneumococcal 23  I will call the lab and make further recommendations adjustments if necessary. Follow stable continue same and follow-up scheduled for 6 months or sooner should it be a problem.     PLAN:  .  Orders Placed This Encounter    Pneumococcal polysaccharide vaccine, 23-valent, adult or immunosuppressed pt dose    AMB POC LIPID PROFILE    AMB POC COMPREHENSIVE METABOLIC PANEL    AMB POC CK (CPK)         ATTENTION:   This medical record was transcribed using an electronic medical records system. Although proofread, it may and can contain electronic and spelling errors. Other human spelling and other errors may be present. Corrections may be executed at a later time. Please feel free to contact us for any clarifications as needed. Follow-up Disposition:  Return in about 6 months (around 12/29/2018). No results found for any visits on 06/29/18. Excell MD Sintia    The patient verbalized understanding of the problems and plans as explained.

## 2018-06-29 NOTE — PROGRESS NOTES
Chief Complaint   Patient presents with    Hypertension     6 month follow up      1. Have you been to the ER, urgent care clinic since your last visit? Hospitalized since your last visit? No    2. Have you seen or consulted any other health care providers outside of the [address] [place] [address] since your last visit? Include any pap smears or colon screening.  No     Fasting

## 2018-06-30 LAB — CK SERPL-CCNC: 128 U/L (ref 24–204)

## 2018-07-05 LAB
ALBUMIN SERPL-MCNC: 4.2 G/DL (ref 3.9–5.4)
ALKALINE PHOS POC: 68 U/L (ref 38–126)
ALT SERPL-CCNC: 54 U/L (ref 9–52)
AST SERPL-CCNC: 31 U/L (ref 14–36)
BUN BLD-MCNC: 15 MG/DL (ref 9–20)
CALCIUM BLD-MCNC: 9.3 MG/DL (ref 8.4–10.2)
CHLORIDE BLD-SCNC: 102 MMOL/L (ref 98–107)
CHOLEST SERPL-MCNC: 138 MG/DL (ref 0–200)
CO2 POC: 25 MMOL/L (ref 22–32)
CREAT BLD-MCNC: 0.8 MG/DL (ref 0.8–1.5)
EGFR (POC): 93.1
GLUCOSE POC: 98 MG/DL (ref 75–110)
HDLC SERPL-MCNC: 51 MG/DL (ref 35–130)
LDL CHOLESTEROL POC: 72.2 MG/DL (ref 0–130)
POTASSIUM SERPL-SCNC: 4.6 MMOL/L (ref 3.6–5)
PROT SERPL-MCNC: 7.4 G/DL (ref 6.3–8.2)
SODIUM SERPL-SCNC: 141 MMOL/L (ref 137–145)
TCHOL/HDL RATIO (POC): 2.7 (ref 0–4)
TOTAL BILIRUBIN POC: 0.9 MG/DL (ref 0.2–1.3)
TRIGL SERPL-MCNC: 74 MG/DL (ref 0–200)
VLDLC SERPL CALC-MCNC: 14.8 MG/DL

## 2018-07-30 RX ORDER — NIACIN 500 MG/1
TABLET, EXTENDED RELEASE ORAL
Qty: 90 TAB | Refills: 3 | Status: SHIPPED | OUTPATIENT
Start: 2018-07-30 | End: 2019-07-05 | Stop reason: SDUPTHER

## 2018-07-30 NOTE — TELEPHONE ENCOUNTER
RX refill request from the patient/pharmacy. Patient last seen 6/29/18 with labs, and next appt. scheduled for 12/21/18.

## 2018-12-20 NOTE — PROGRESS NOTES
HPI:   79 y.o.  presents for comprehensive annual healthcare examination and follow up appointment. No hospital, ER or specialist visits since last primary care visit except as noted below. He is also in follow-up of his medical problems include hypertension, glucose intolerance, hyperlipidemia, DJD, history of prostate carcinoma status post prostatectomy, ASCVD and other medical problems. He denies any chest pain, shortness breath, palpitations, PND, orthopnea or other cardiorespiratory complaints except for mild residual cough when he had a recent respiratory infection but he feels like that is getting better although he would like some cough medicine. He currently denies any GI or  complaints. He denies any headaches, dizziness or neurological planes. There are no current arthritic complaints and he has no other complaints on complete review of systems. He is taking his medications and trying to follow his diet and get some exercise.     Patient Active Problem List    Diagnosis    Primary osteoarthritis involving multiple joints    Essential hypertension    Glucose intolerance (impaired glucose tolerance)    Mixed hyperlipidemia    Annual physical exam    ED (erectile dysfunction)    History of tobacco abuse    [facility]    ASVD (arteriosclerotic vascular disease)       Past Medical History:   Diagnosis Date    Arthritis of multiple sites 12/21/2017    ASVD (arteriosclerotic vascular disease) 12/21/2017    Cancer ([address]     prostate    DJD (degenerative joint disease) 12/21/2017    ED (erectile dysfunction) 12/21/2017    Glucose intolerance (impaired glucose tolerance) 12/21/2017    History of tobacco abuse 12/21/2017    HTN (hypertension) 12/21/2017    Hypercholesterolemia     Hyperlipidemia 12/21/2017    On long term drug therapy 12/21/2017    PAD (peripheral artery disease) ([address]     left leg    Prostate carcinoma ([facility] [address] 12/21/2017       Social History     Tobacco Use  Smoking status: Former Smoker     Packs/day: 1.00     Years: 45.00     Pack years: 45.00     Last attempt to quit: 2012     Years since quittin.9    Smokeless tobacco: Never Used   Substance Use Topics    Alcohol use: Yes     Alcohol/week: 3.0 oz     Types: 6 Cans of beer per week    Drug use: No       Family History   Problem Relation Age of Onset    Diabetes Mother     Alcohol abuse Father            Allergies   Allergen Reactions    Bee Sting [Sting, Bee] Swelling     Swelling (local area of sting) - per RN David Holding in PAT    Penicillins Unknown (comments)       ROS:     Constitutional: He denies fevers, weight loss, sweats. Eyes: No blurred or double vision. ENT: No difficulty with swallowing, taste, speech or smell. Neck: no stiffness or swelling  Respiratory: No cough wheezing or shortness of breath. Cardiovascular: Denies chest pain, palpitations, unexplained indigestion or syncope. Gastrointestinal:  No changes in bowel movements, no abdominal pain, no bloating. Genitourinary:  He denies frequency, nocturia or stranguria. Extremities: No joint pain, stiffness or swelling. Neurological:  No numbness, tingling, burring paresthesias or loss of motor strength. No syncope, dizziness or frequent headache  Lymphatic: no adenopathy noted  Hematologic: no easy bruising or bleeding gums  Skin:  No recent rashes or mole changes. Psychiatric/Behavioral:  Negative for depression. The patient is not nervous/anxious. PE:     Vitals:    18 0842   BP: 112/70   Pulse: 72   Resp: 18   SpO2: 97%   Weight: 172 lb 12.8 oz (78.4 kg)   Height: 5' 8.5\" (1.74 m)   PainSc:   0 - No pain        General appearance - alert, well appearing, and in no distress  Mental status - alert, oriented to person, place, and time  HEENT:  Ears - bilateral TM's and external ear canals clear  Eyes - pupillary responses were normal.  Extraocular muscle function intact. Lids and conjunctiva not injected.   Fundoscopic exam revealed sharp disc margins. eye movements intact  Pharynx- clear with teeth in good repair. No masses were noted  Neck - supple without thyromegaly or burit. No JVD noted  Lungs - clear to auscultation and percussion  Cardiac- normal rate, regular rhythm without murmurs. PMI not displaced. No gallop, rub or click  Abdomen - flat, soft, non-tender without palpable organomegaly or mass. No pulsatile mass was felt, and not bruit was heard. Bowel sounds were active  : Circumcised, Testes descended w/o masses  Rectal: normal sphincter tone, prostatic fossa empty status post prostatectomy, no masses, stool brown and hemacult negative  Extremities -  no clubbing cyanosis or edema  Lymphatics - no palpable lymphadenopathy, no hepatosplenomegaly  Hematologic: no petechiae or purpura  Peripheral vascular -Femoral, Dorsalis pedis and posterior tibial pulses felt without difficulty  Skin - no rash or unusual mole change noted  Neurological - Cranial nerves II-XII grossly intact. Motor strength 5/5. DTR's 2+ and symmetric. Station and gait normal  Back exam - full range of motion, no tenderness, palpable spasm or pain on motion  Musculoskeletal - no joint tenderness, deformity or swelling      Assessment/Plan:     1. Annual physical exam    2. Essential hypertension    3. Glucose intolerance (impaired glucose tolerance)    4. Mixed hyperlipidemia    5. Primary osteoarthritis involving multiple joints    6. Prostate carcinoma ([address]        Pression  1. Annual physical examination normal except the residual cough  2. Hypertension is controlled to continue current therapy reviewed with him  3   Glucose intolerance repeat status pending prior labs reviewed no make adjustments if necessary. 4.  Hyperlipidemia prior lab reviewed repeat status pending I will adjust if needed. 5. DJD that is stable  6.   Prostate carcinoma status post prostatectomy prostate prostate is without residual  I will call the lab results and make further recommendations or adjustments if necessary. Follow-up as scheduled for 6 months or sooner should to be a problem. Health Maintenance reviewed - updated. Orders Placed This Encounter    T4, FREE    METABOLIC PANEL, COMPREHENSIVE    LIPID PANEL    TSH 3RD GENERATION    CK    PROSTATE SPECIFIC AG    AMB POC COMPLETE CBC,AUTOMATED ENTER    AMB POC URINALYSIS DIP STICK AUTO W/ MICRO        Medications Discontinued During This Encounter   Medication Reason    EPINEPHrine (EPIPEN 2-IGNACIO) 0.3 mg/0.3 mL injection Alternate Therapy       Current Outpatient Medications   Medication Sig Dispense Refill    niacin ER (NIASPAN) 500 mg tablet TAKE 1 TABLET AT BEDTIME 90 Tab 3    simvastatin (ZOCOR) 80 mg tablet TAKE 1 TABLET DAILY 90 Tab 3    cilostazol (PLETAL) 100 mg tablet Take 1 Tab by mouth Before breakfast and dinner. 180 Tab prn    tadalafil (CIALIS) 20 mg tablet Take 1 Tab by mouth as needed. 10 Tab prn    aspirin (ASPIRIN) 325 mg tablet Take 325 mg by mouth daily. No results found for any visits on 12/21/18. Recommended healthy diet low in carbohydrates, fats, sodium and cholesterol. Recommended regular cardiovascular exercise 3-6 times per week for 30-60 minutes daily. Verbal and written instructions (see AVS) provided. Patient expresses understanding of diagnosis and treatment plan.       Liang Reyes MD

## 2018-12-21 ENCOUNTER — OFFICE VISIT (OUTPATIENT)
Dept: INTERNAL MEDICINE CLINIC | Age: 68
End: 2018-12-21

## 2018-12-21 VITALS
RESPIRATION RATE: 18 BRPM | DIASTOLIC BLOOD PRESSURE: 70 MMHG | HEART RATE: 72 BPM | WEIGHT: 172.8 LBS | BODY MASS INDEX: 25.59 KG/M2 | SYSTOLIC BLOOD PRESSURE: 112 MMHG | OXYGEN SATURATION: 97 % | HEIGHT: 69 IN

## 2018-12-21 DIAGNOSIS — C61 PROSTATE CARCINOMA (HCC): ICD-10-CM

## 2018-12-21 DIAGNOSIS — I10 ESSENTIAL HYPERTENSION: ICD-10-CM

## 2018-12-21 DIAGNOSIS — R73.02 GLUCOSE INTOLERANCE (IMPAIRED GLUCOSE TOLERANCE): ICD-10-CM

## 2018-12-21 DIAGNOSIS — E78.2 MIXED HYPERLIPIDEMIA: ICD-10-CM

## 2018-12-21 DIAGNOSIS — M15.9 PRIMARY OSTEOARTHRITIS INVOLVING MULTIPLE JOINTS: ICD-10-CM

## 2018-12-21 DIAGNOSIS — Z00.00 ANNUAL PHYSICAL EXAM: Primary | ICD-10-CM

## 2018-12-21 LAB
BACTERIA UA POCT, BACTPOCT: NORMAL
BILIRUB UR QL STRIP: NEGATIVE
CASTS UA POCT: NEGATIVE
CLUE CELLS, CLUEPOCT: NEGATIVE
CRYSTALS UA POCT, CRYSPOCT: NEGATIVE
EPITHELIAL CELLS POCT: NEGATIVE
GLUCOSE UR-MCNC: NEGATIVE MG/DL
GRAN# POC: 4.5 K/UL (ref 2–7.8)
GRAN% POC: 61.5 % (ref 37–92)
HCT VFR BLD CALC: 44.4 % (ref 37–51)
HGB BLD-MCNC: 15.3 G/DL (ref 12–18)
KETONES P FAST UR STRIP-MCNC: NEGATIVE MG/DL
LY# POC: 2.4 K/UL (ref 0.6–4.1)
LY% POC: 34.8 % (ref 10–58.5)
MCH RBC QN: 31.5 PG (ref 26–32)
MCHC RBC-ENTMCNC: 34.4 G/DL (ref 30–36)
MCV RBC: 91 FL (ref 80–97)
MID #, POC: 0.2 K/UL (ref 0–1.8)
MID% POC: 3.7 % (ref 0.1–24)
MUCUS UA POCT, MUCPOCT: NORMAL
PH UR STRIP: 6 [PH] (ref 5–7)
PLATELET # BLD: 249 K/UL (ref 140–440)
PROT UR QL STRIP: NEGATIVE
RBC # BLD: 4.85 M/UL (ref 4.2–6.3)
RBC UA POCT, RBCPOCT: 0
SP GR UR STRIP: 1.01 (ref 1.01–1.02)
TRICH UA POCT, TRICHPOC: NEGATIVE
UA UROBILINOGEN AMB POC: NORMAL (ref 0.2–1)
URINALYSIS CLARITY POC: CLEAR
URINALYSIS COLOR POC: NORMAL
URINE BLOOD POC: NEGATIVE
URINE CULT COMMENT, POCT: NORMAL
URINE LEUKOCYTES POC: NEGATIVE
URINE NITRITES POC: NEGATIVE
WBC # BLD: 7.1 K/UL (ref 4.1–10.9)
WBC UA POCT, WBCPOCT: 0
YEAST UA POCT, YEASTPOC: NEGATIVE

## 2018-12-21 RX ORDER — BENZONATATE 200 MG/1
200 CAPSULE ORAL
Qty: 30 CAP | Refills: 0 | Status: SHIPPED | OUTPATIENT
Start: 2018-12-21 | End: 2018-12-28

## 2018-12-21 RX ORDER — BENZONATATE 200 MG/1
200 CAPSULE ORAL
Qty: 30 CAP | Refills: 0 | Status: SHIPPED | OUTPATIENT
Start: 2018-12-21 | End: 2018-12-21 | Stop reason: SDUPTHER

## 2018-12-21 NOTE — PROGRESS NOTES
Chief Complaint   Patient presents with   [address] [place] [address] Annual Wellness Visit     1. Have you been to the ER, urgent care clinic since your last visit? Hospitalized since your last visit? No    2. Have you seen or consulted any other health care providers outside of the [address] [place] [address] since your last visit? Include any pap smears or colon screening. No  Visit Vitals  /70 (BP 1 Location: Left arm, BP Patient Position: Sitting)   Pulse 72   Resp 18   Ht 5' 8.5\" (1.74 m)   Wt 172 lb 12.8 oz (78.4 kg)   SpO2 97%   BMI 25.89 kg/m²     Patient had his high dose Flu shot at [facility] on 8/1/18.

## 2018-12-21 NOTE — PATIENT INSTRUCTIONS

## 2018-12-22 LAB
ALBUMIN SERPL-MCNC: 4.5 G/DL (ref 3.6–4.8)
ALBUMIN/GLOB SERPL: 1.5 {RATIO} (ref 1.2–2.2)
ALP SERPL-CCNC: 78 IU/L (ref 39–117)
ALT SERPL-CCNC: 36 IU/L (ref 0–44)
AST SERPL-CCNC: 29 IU/L (ref 0–40)
BILIRUB SERPL-MCNC: 0.5 MG/DL (ref 0–1.2)
BUN SERPL-MCNC: 9 MG/DL (ref 8–27)
BUN/CREAT SERPL: 9 (ref 10–24)
CALCIUM SERPL-MCNC: 9.4 MG/DL (ref 8.6–10.2)
CHLORIDE SERPL-SCNC: 104 MMOL/L (ref 96–106)
CHOLEST SERPL-MCNC: 150 MG/DL (ref 100–199)
CK SERPL-CCNC: 93 U/L (ref 24–204)
CO2 SERPL-SCNC: 21 MMOL/L (ref 20–29)
CREAT SERPL-MCNC: 0.96 MG/DL (ref 0.76–1.27)
GLOBULIN SER CALC-MCNC: 3 G/DL (ref 1.5–4.5)
GLUCOSE SERPL-MCNC: 116 MG/DL (ref 65–99)
HDLC SERPL-MCNC: 44 MG/DL
LDLC SERPL CALC-MCNC: 92 MG/DL (ref 0–99)
POTASSIUM SERPL-SCNC: 5 MMOL/L (ref 3.5–5.2)
PROT SERPL-MCNC: 7.5 G/DL (ref 6–8.5)
PSA SERPL-MCNC: <0.1 NG/ML (ref 0–4)
SODIUM SERPL-SCNC: 142 MMOL/L (ref 134–144)
T4 FREE SERPL-MCNC: 1.35 NG/DL (ref 0.82–1.77)
TRIGL SERPL-MCNC: 72 MG/DL (ref 0–149)
TSH SERPL DL<=0.005 MIU/L-ACNC: 2.28 UIU/ML (ref 0.45–4.5)
VLDLC SERPL CALC-MCNC: 14 MG/DL (ref 5–40)

## 2019-05-02 PROBLEM — H02.403 PTOSIS OF EYELID, BILATERAL: Status: ACTIVE | Noted: 2019-05-02

## 2019-05-02 PROBLEM — Z01.810 PRE-OPERATIVE CARDIOVASCULAR EXAMINATION: Status: ACTIVE | Noted: 2019-05-02

## 2019-05-02 NOTE — PROGRESS NOTES
HPI:  
79 y.o.  presents for preoperative medical consultation and cardiovascular clearance prior to planned bilateral blepharoplasty to be done by Dr. Chelsie Thomas at [facility] on 5/15/2019. He is regular followed by me for hypertension, glucose intolerance, hyperlipidemia, ASCVD and other medical problems. He currently denies any chest pain, shortness of breath, palpitations, PND, orthopnea or other cardiorespiratory complaints. He denies any GI or  complaints. He denies any headaches, dizziness or neurologic complaints. There are no current arthritic complaints and no other complaints on complete review of systems other than visual impairment secondary to significant ptosis bilateral. 
 
Patient Active Problem List  
 Diagnosis  Primary osteoarthritis involving multiple joints  Essential hypertension  Glucose intolerance (impaired glucose tolerance)  Mixed hyperlipidemia  Pre-operative cardiovascular examination  Ptosis of eyelid, bilateral  
 Annual physical exam  
 ED (erectile dysfunction)  History of tobacco abuse  Prostate carcinoma [facility]  ASVD (arteriosclerotic vascular disease) Past Medical History:  
Diagnosis Date  Arthritis of multiple sites 2017  ASVD (arteriosclerotic vascular disease) 2017  Cancer [facility])   
 prostate  DJD (degenerative joint disease) 2017  ED (erectile dysfunction) 2017  Glucose intolerance (impaired glucose tolerance) 2017  History of tobacco abuse 2017  
 HTN (hypertension) 2017  Hypercholesterolemia  Hyperlipidemia 2017  On long term drug therapy 2017  PAD (peripheral artery disease) ([facility])   
 left leg  Prostate carcinoma ([facility] 2017 Social History Tobacco Use  Smoking status: Former Smoker Packs/day: 1.00 Years: 45.00 Pack years: 45.00 Last attempt to quit:  Years since quittin.3  Smokeless tobacco: Never Used Substance Use Topics  Alcohol use: Yes Alcohol/week: 3.0 oz Types: 6 Cans of beer per week  Drug use: No  
 
 
Family History Problem Relation Age of Onset  Diabetes Mother  Alcohol abuse Father Allergies Allergen Reactions  Bee Sting [Sting, Bee] Swelling Swelling (local area of sting) - per RN Zana Billing in PAT  Penicillins Unknown (comments) ROS:  
 
Constitutional: He denies fevers, weight loss, sweats. Eyes: No blurred or double vision. But upper visual field impairment secondary to ptosis ENT: No difficulty with swallowing, taste, speech or smell. Neck: no stiffness or swelling Respiratory: No cough wheezing or shortness of breath. Cardiovascular: Denies chest pain, palpitations, unexplained indigestion or syncope. Gastrointestinal:  No changes in bowel movements, no abdominal pain, no bloating. Genitourinary:  He denies frequency, nocturia or stranguria. Extremities: No joint pain, stiffness or swelling. Neurological:  No numbness, tingling, burring paresthesias or loss of motor strength. No syncope, dizziness or frequent headache Lymphatic: no adenopathy noted Hematologic: no easy bruising or bleeding gums Skin:  No recent rashes or mole changes. Psychiatric/Behavioral:  Negative for depression. The patient is not nervous/anxious. PE:  
 
Vitals:  
 05/03/19 1300 BP: 128/70 Pulse: 80 Resp: 18 Temp: 97.6 °F (36.4 °C) TempSrc: Oral  
SpO2: 95% Weight: 179 lb (81.2 kg) Height: 5' 8.5\" (1.74 m) PainSc:   0 - No pain General appearance - alert, well appearing, and in no distress Mental status - alert, oriented to person, place, and time HEENT: 
Ears - bilateral TM's and external ear canals clear Eyes - pupillary responses were normal.  Extraocular muscle function intact. Lids and conjunctiva not injected.   Bilateral ptosis noted fundoscopic exam revealed sharp disc margins. eye movements intact Pharynx- clear with teeth in good repair. No masses were noted Neck - supple without thyromegaly or burit. No JVD noted Lungs - clear to auscultation and percussion Cardiac- normal rate, regular rhythm without murmurs. PMI not displaced. No gallop, rub or click Abdomen - flat, soft, non-tender without palpable organomegaly or mass. No pulsatile mass was felt, and not bruit was heard. Bowel sounds were active Extremities -  no clubbing cyanosis or edema Lymphatics - no palpable lymphadenopathy, no hepatosplenomegaly Hematologic: no petechiae or purpura Peripheral vascular -Femoral, Dorsalis pedis and posterior tibial pulses felt without difficulty Skin - no rash or unusual mole change noted Neurological - Cranial nerves II-XII grossly intact. Motor strength 5/5. DTR's 2+ and symmetric. Station and gait normal 
Back exam - full range of motion, no tenderness, palpable spasm or pain on motion Musculoskeletal - no joint tenderness, deformity or swelling Assessment/Plan: 1. Pre-operative cardiovascular examination 2. Ptosis of eyelid, bilateral   
3. Essential hypertension 4. Glucose intolerance (impaired glucose tolerance) 5. Mixed hyperlipidemia 6. Primary osteoarthritis involving multiple joints Impression 1. Preoperative cardiovascular examination completed today. 2.  Ptosis bilateral eyelids with surgery scheduled for May 15 
3. Hypertension that is controlled 4. Glucose intolerance with last blood sugar by me 116 
5. Hyperlipidemia good on last check 6. DJD stable He is medically stable for the planned surgery. Copy to Dr. Liat Blanca. Health Maintenance reviewed - updated. No orders of the defined types were placed in this encounter. There are no discontinued medications. Current Outpatient Medications Medication Sig Dispense Refill  niacin ER (NIASPAN) 500 mg tablet TAKE 1 TABLET AT BEDTIME 90 Tab 3  
 simvastatin (ZOCOR) 80 mg tablet TAKE 1 TABLET DAILY 90 Tab 3  
 cilostazol (PLETAL) 100 mg tablet Take 1 Tab by mouth Before breakfast and dinner. 180 Tab prn  tadalafil (CIALIS) 20 mg tablet Take 1 Tab by mouth as needed. 10 Tab prn  aspirin (ASPIRIN) 325 mg tablet Take 325 mg by mouth daily. No results found for any visits on 05/03/19. Recommended healthy diet low in carbohydrates, fats, sodium and cholesterol. Recommended regular cardiovascular exercise 3-6 times per week for 30-60 minutes daily. Verbal and written instructions (see AVS) provided. Patient expresses understanding of diagnosis and treatment plan.  
 
 
Ivy Siemens, MD

## 2019-05-03 ENCOUNTER — OFFICE VISIT (OUTPATIENT)
Dept: INTERNAL MEDICINE CLINIC | Age: 69
End: 2019-05-03

## 2019-05-03 VITALS
HEIGHT: 69 IN | RESPIRATION RATE: 18 BRPM | HEART RATE: 80 BPM | SYSTOLIC BLOOD PRESSURE: 128 MMHG | BODY MASS INDEX: 26.51 KG/M2 | DIASTOLIC BLOOD PRESSURE: 70 MMHG | TEMPERATURE: 97.6 F | WEIGHT: 179 LBS | OXYGEN SATURATION: 95 %

## 2019-05-03 DIAGNOSIS — H02.403 PTOSIS OF EYELID, BILATERAL: ICD-10-CM

## 2019-05-03 DIAGNOSIS — R73.02 GLUCOSE INTOLERANCE (IMPAIRED GLUCOSE TOLERANCE): ICD-10-CM

## 2019-05-03 DIAGNOSIS — I10 ESSENTIAL HYPERTENSION: ICD-10-CM

## 2019-05-03 DIAGNOSIS — M15.9 PRIMARY OSTEOARTHRITIS INVOLVING MULTIPLE JOINTS: ICD-10-CM

## 2019-05-03 DIAGNOSIS — Z01.810 PRE-OPERATIVE CARDIOVASCULAR EXAMINATION: Primary | ICD-10-CM

## 2019-05-03 DIAGNOSIS — E78.2 MIXED HYPERLIPIDEMIA: ICD-10-CM

## 2019-05-03 NOTE — PATIENT INSTRUCTIONS
Eyelid Surgery: Before Your Surgery What is eyelid surgery? There are two types of eyelid surgery. You may have one or both types of surgery. These surgeries can be done on one or both of your eyes. Surgery for ptosis lifts droopy upper eyelids. Ptosis (say \"MIKY-carmen\") is the name for eyelids that droop. The eyelid muscles or tendons do not work as they should. This can affect your vision and your appearance. It can be caused by aging, nerve or muscle problems, eye surgery, or an injury. You can be born with this problem, or you can get it later in life. The doctor makes a small cut in the crease of your upper eyelid. The cut is called an incision. He or she then lifts the eyelid by tightening the muscle that raises your eyelid. In rare cases, the muscle is too weak to tighten. In that case, the doctor will connect your forehead muscles to your eyelid muscles. After fixing the problem, the doctor stitches up the incision. Minor cases may not need a cut in the skin. Blepharoplasty (say \"HNSA-gf-ecr-plass-delbert\") is surgery to remove baggy, extra tissue on your upper or lower eyelids. In most cases, this extra tissue forms as you age. It may affect your vision. But more often, it affects how you look. This surgery is usually considered cosmetic, or plastic, surgery. The doctor makes small incisions in the creases of your upper eyelids and just below the lashes of your lower eyelids. The doctor removes extra tissue through the incisions. The doctor then stitches the incisions closed. During either surgery, you will get medicine so you will not feel pain. You may get medicine that relaxes you or puts you into a light sleep. Eyelid surgery takes about 1 to 2 hours. You will probably go home on the same day as your surgery. After surgery, you will have tiny scars. These scars will fade over time.  
Most people feel ready to go out in public and back to work in about 7 to 10 days. After either surgery, you may be able to see better. You may like how the surgery affects your appearance. Follow-up care is a key part of your treatment and safety. Be sure to make and go to all appointments, and call your doctor if you are having problems. It's also a good idea to know your test results and keep a list of the medicines you take. What happens before surgery? 
 Surgery can be stressful. This information will help you understand what you can expect. And it will help you safely prepare for surgery. 
 Preparing for surgery 
  · Understand exactly what surgery is planned, along with the risks, benefits, and other options. · Tell your doctors ALL the medicines, vitamins, supplements, and herbal remedies you take. Some of these can increase the risk of bleeding or interact with anesthesia.  
  · If you take blood thinners, such as warfarin (Coumadin), clopidogrel (Plavix), or aspirin, be sure to talk to your doctor. He or she will tell you if you should stop taking these medicines before your surgery. Make sure that you understand exactly what your doctor wants you to do.  
  · Your doctor will tell you which medicines to take or stop before your surgery. You may need to stop taking certain medicines a week or more before surgery. So talk to your doctor as soon as you can.  
  · If you have an advance directive, let your doctor know. It may include a living will and a durable power of  for health care. Bring a copy to the hospital. If you don't have one, you may want to prepare one. It lets your doctor and loved ones know your health care wishes. Doctors advise that everyone prepare these papers before any type of surgery or procedure. What happens on the day of surgery? · Follow the instructions exactly about when to stop eating and drinking. If you don't, your surgery may be canceled.  If your doctor told you to take your medicines on the day of surgery, take them with only a sip of water.  
  · Take a bath or shower before you come in for your surgery. Do not apply lotions, perfumes, deodorants, or nail polish.  
  · Do not shave the surgical site yourself.  
  · Take off all jewelry and piercings. And take out contact lenses, if you wear them.  
 At the hospital or surgery center · Bring a picture ID.  
  · The area for surgery is often marked to make sure there are no errors.  
  · You will be kept comfortable and safe by your anesthesia provider. You may get medicine that relaxes you or puts you in a light sleep. The area being worked on will be numb.  
  · The surgery will take about 1 to 2 hours.  
  · You may have a bandage over your eye. If you had surgery for ptosis, your lower lid may be taped to your forehead. This protects your eye from the bandage. You may also have an ice pack over your eye to prevent swelling. Going home · Be sure you have someone to drive you home. Anesthesia and pain medicine make it unsafe for you to drive.  
  · You will be given more specific instructions about recovering from your surgery. They will cover things like diet, wound care, follow-up care, driving, and getting back to your normal routine. When should you call your doctor? · You have questions or concerns.  
  · You don't understand how to prepare for your surgery.  
  · You become ill before the surgery (such as fever, flu, or a cold).  
  · You need to reschedule or have changed your mind about having the surgery. Where can you learn more? Go to http://meghan-nicole.info/. Enter 89227 [id number] [phone] in the search box to learn more about \"Eyelid Surgery: Before Your Surgery. \" Current as of: April 17, 2018 Content Version: 11.9 © 4157-7923 [employer].  Care instructions adapted under license by [employer] (which disclaims liability or warranty for this information). If you have questions about a medical condition or this instruction, always ask your healthcare professional. [place] [address] any warranty or liability for your use of this information.

## 2019-05-03 NOTE — PROGRESS NOTES
Cyndi Andrew  Identified pt with two pt identifiers(name and ). Chief Complaint Patient presents with  Pre-op Exam  
  eye lid surgery 1. Have you been to the ER, urgent care clinic since your last visit? Hospitalized since your last visit? No 
 
2. Have you seen or consulted any other health care providers outside of the [address] [place] [address] since your last visit? Include any pap smears or colon screening. Yes, EDWINA TOLBERT in [place] for his eye lid. Health Maintenance Topics with due status: Overdue Topic Date Due DTaP/Tdap/Td series 1973 Shingrix Vaccine Age 50> 2002 GLAUCOMA SCREENING Q2Y 2017 AAA Screening 73-69 YO Male Smoking Patients 2017 Health Maintenance Topics with due status: Not Due Topic Last Completion Date COLONOSCOPY 2018 Influenza Age 5 to Adult 09/15/2018 Health Maintenance Topics with due status: Completed Topic Last Completion Date Hepatitis C Screening 2017 Pneumococcal 65+ years 2018 Medication reconciliation up to date and corrected with patient at this time. Today's provider has been notified of reason for visit, vitals and flowsheets obtained on patients. Reviewed record in preparation for visit, huddled with provider and have obtained necessary documentation. Wt Readings from Last 3 Encounters:  
19 179 lb (81.2 kg) 18 172 lb 12.8 oz (78.4 kg) 18 174 lb 12.8 oz (79.3 kg) Temp Readings from Last 3 Encounters:  
19 97.6 °F (36.4 °C) (Oral) 17 97.7 °F (36.5 °C) (Oral) 02/15/12 98.4 °F (36.9 °C) BP Readings from Last 3 Encounters:  
19 128/70  
18 112/70  
18 122/80 Pulse Readings from Last 3 Encounters:  
19 80  
18 72  
18 80 Vitals:  
 19 1300 BP: 128/70 Pulse: 80 Resp: 18 Temp: 97.6 °F (36.4 °C) TempSrc: Oral  
SpO2: 95% Weight: 179 lb (81.2 kg) Height: 5' 8.5\" (1.74 m) PainSc:   0 - No pain Learning Assessment: 
:  
 
Learning Assessment 12/22/2017 PRIMARY LEARNER Patient HIGHEST LEVEL OF EDUCATION - PRIMARY LEARNER  GRADUATED HIGH SCHOOL OR GED  
BARRIERS PRIMARY LEARNER VISUAL  
CO-LEARNER CAREGIVER No  
PRIMARY LANGUAGE ENGLISH  
LEARNER PREFERENCE PRIMARY VIDEOS  
ANSWERED BY patient RELATIONSHIP SELF Depression Screening: 
:  
 
3 most recent PHQ Screens 5/3/2019 Little interest or pleasure in doing things Not at all Feeling down, depressed, irritable, or hopeless Not at all Total Score PHQ 2 0 No flowsheet data found. Fall Risk Assessment: 
:  
 
Fall Risk Assessment, last 12 mths 12/21/2018 Able to walk? Yes Fall in past 12 months? No  
 
 
Abuse Screening: 
:  
 
Abuse Screening Questionnaire 5/3/2019 12/21/2018 12/22/2017 Do you ever feel afraid of your partner? N N N Are you in a relationship with someone who physically or mentally threatens you? N N N Is it safe for you to go home? Demetri Carrillo  
 
 
ADL Screening: 
:  
 

## 2019-07-03 NOTE — PROGRESS NOTES
Chief Complaint   Patient presents with    Blood Pressure Check    Cholesterol Problem    Blood sugar problem    Corrie     wants to get it removed on back of neck       SUBJECTIVE:    Edson Guan is a 81 y.o. male who returns in follow-up for his medical problems include hypertension, glucose intolerance, hyperlipidemia, DJD, ASVD, prior tobacco abuse and other medical problems. He currently denies any chest pain, shortness of breath, palpitations, PND, orthopnea or other cardiorespiratory complaints. He notes no GI or  complaints. He notes no headaches, dizziness or neurologic complaints. He has no change of his chronic arthritic complaints. He notes no other complaints on complete review of systems except he has noted a skin lesion increasing in size on the posterior aspect of his neck on the right side. Current Outpatient Medications   Medication Sig Dispense Refill    aspirin (ASPIRIN) 325 mg tablet Take 325 mg by mouth daily.  cilostazol (PLETAL) 100 mg tablet Take 1 Tab by mouth Before breakfast and dinner. 180 Tab prn    niacin ER (NIASPAN) 500 mg tablet TAKE 1 TABLET AT BEDTIME 90 Tab 3    simvastatin (ZOCOR) 80 mg tablet TAKE 1 TABLET DAILY 90 Tab 3    tadalafil (CIALIS) 20 mg tablet Take 1 Tab by mouth as needed (ED).  10 Tab prn     Past Medical History:   Diagnosis Date    Arthritis of multiple sites 12/21/2017    ASVD (arteriosclerotic vascular disease) 12/21/2017    Cancer ([address]     prostate    DJD (degenerative joint disease) 12/21/2017    ED (erectile dysfunction) 12/21/2017    Glucose intolerance (impaired glucose tolerance) 12/21/2017    History of tobacco abuse 12/21/2017    HTN (hypertension) 12/21/2017    Hypercholesterolemia     Hyperlipidemia 12/21/2017    On long term drug therapy 12/21/2017    PAD (peripheral artery disease) (HCC)     left leg    Prostate carcinoma ([address] 12/21/2017     Past Surgical History:   Procedure Laterality Date    HX GI  HX HEMORRHOIDECTOMY      VASCULAR SURGERY PROCEDURE UNLIST      left leg stent     Allergies   Allergen Reactions    Bee Sting [Sting, Bee] Swelling     Swelling (local area of sting) - per COLT Wolfe in PAT    Penicillins Unknown (comments)       REVIEW OF SYSTEMS:  General: negative for - chills or fever, or weight loss or gain  ENT: negative for - headaches, nasal congestion or tinnitus  Eyes: no blurred or visual changes  Neck: No stiffness or swollen nodes  Respiratory: negative for - cough, hemoptysis, shortness of breath or wheezing  Cardiovascular : negative for - chest pain, edema, palpitations or shortness of breath  Gastrointestinal: negative for - abdominal pain, blood in stools, heartburn or nausea/vomiting  Genito-Urinary: no dysuria, trouble voiding, or hematuria  Musculoskeletal: negative for - gait disturbance, change of his chronic joint pain, joint stiffness or joint swelling  Neurological: no TIA or stroke symptoms  Hematologic: no bruises, no bleeding  Lymphatic: no swollen glands  Integument: no lumps, mole changes, nail changes or rash except skin lesion increasing in size right posterior neck  Endocrine:no malaise/lethargy poly uria or polydipsia or unexpected weight changes        Social History     Socioeconomic History    Marital status:      Spouse name: Not on file    Number of children: Not on file    Years of education: Not on file    Highest education level: Not on file   Tobacco Use    Smoking status: Former Smoker     Packs/day: 1.00     Years: 45.00     Pack years: 45.00     Last attempt to quit:      Years since quittin.5    Smokeless tobacco: Never Used   Substance and Sexual Activity    Alcohol use:  Yes     Alcohol/week: 3.0 oz     Types: 6 Cans of beer per week    Drug use: No    Sexual activity: Yes     Partners: Female     Birth control/protection: Surgical     Family History   Problem Relation Age of Onset    Diabetes Mother     Alcohol abuse Father        OBJECTIVE:     Visit Vitals  /70 (BP 1 Location: Left arm, BP Patient Position: Sitting)   Pulse 65   Temp 98.7 °F (37.1 °C) (Oral)   Resp 20   Ht 5' 8.5\" (1.74 m)   Wt 174 lb (78.9 kg)   SpO2 97%   BMI 26.07 kg/m²     CONSTITUTIONAL:   well nourished, appears age appropriate  EYES: sclera anicteric, PERRL, EOMI  ENMT:nares clear, moist mucous membranes, pharynx clear  NECK: supple. Thyroid normal, No JVD or bruits  RESPIRATORY: Chest: clear to ascultation and percussion, normal inspiratory effort  CARDIOVASCULAR: Heart: regular rate and rhythm no murmurs, rubs or gallops, PMI not displaced, No thrills  GASTROINTESTINAL: Abdomen: non distended, soft, non tender, bowel sounds normal  HEMATOLOGIC: no purpura, petechiae or bruising  LYMPHATIC: No lymph node enlargemant  MUSCULOSKELETAL: Extremities: no edema or active synovitis, pulse 1+   INTEGUMENT: No unusual rashes or suspicious skin lesions noted. Nails appear normal.  Raised irregular skin lesion right posterior neck  PERIPHERAL VASCULAR: normal pulses femoral, PT and DP  NEUROLOGIC: non-focal exam, A & O X 3  PSYCHIATRIC:, appropriate affect     ASSESSMENT:   1. Essential hypertension    2. Glucose intolerance (impaired glucose tolerance)    3. Mixed hyperlipidemia    4. Primary osteoarthritis involving multiple joints    5. Prostate carcinoma ([address]    6. ASVD (arteriosclerotic vascular disease)      Impression  1. Hypertension is controlled to continue current therapy reviewed with him. 2.  Glucose intolerance repeat status pending a prior lab review no make adjustments if necessary. 3.  Hyperlipidemia prior lab reviewed and repeat status pending I will adjust if needed. Next #4 DJD that is stable  5. Prostate carcinoma stable  6. ASCVD clinically stable continue aspirin daily  7.   Skin lesion right posterior neck we will set him up for an afternoon appointment to remove this with  I will call the lab results and make further recommendations or adjustments if necessary. Follow-up scheduled for 6 months or sooner if there is a problem. PLAN:  .  Orders Placed This Encounter    HEMOGLOBIN A1C WITH EAG    METABOLIC PANEL, COMPREHENSIVE ([facility])    LIPID PANEL ([facility])    CK ([facility])    cilostazol (PLETAL) 100 mg tablet    niacin ER (NIASPAN) 500 mg tablet    simvastatin (ZOCOR) 80 mg tablet    tadalafil (CIALIS) 20 mg tablet         ATTENTION:   This medical record was transcribed using an electronic medical records system. Although proofread, it may and can contain electronic and spelling errors. Other human spelling and other errors may be present. Corrections may be executed at a later time. Please feel free to contact us for any clarifications as needed. Follow-up and Dispositions    · Return in about 6 months (around 1/5/2020). No results found for any visits on 07/05/19. Becky Mahan MD    The patient verbalized understanding of the problems and plans as explained.

## 2019-07-05 ENCOUNTER — OFFICE VISIT (OUTPATIENT)
Dept: INTERNAL MEDICINE CLINIC | Age: 69
End: 2019-07-05

## 2019-07-05 VITALS
HEART RATE: 65 BPM | WEIGHT: 174 LBS | SYSTOLIC BLOOD PRESSURE: 130 MMHG | BODY MASS INDEX: 25.77 KG/M2 | DIASTOLIC BLOOD PRESSURE: 70 MMHG | OXYGEN SATURATION: 97 % | TEMPERATURE: 98.7 F | RESPIRATION RATE: 20 BRPM | HEIGHT: 69 IN

## 2019-07-05 DIAGNOSIS — M15.9 PRIMARY OSTEOARTHRITIS INVOLVING MULTIPLE JOINTS: ICD-10-CM

## 2019-07-05 DIAGNOSIS — C61 PROSTATE CARCINOMA (HCC): ICD-10-CM

## 2019-07-05 DIAGNOSIS — E78.2 MIXED HYPERLIPIDEMIA: ICD-10-CM

## 2019-07-05 DIAGNOSIS — I70.90 ASVD (ARTERIOSCLEROTIC VASCULAR DISEASE): ICD-10-CM

## 2019-07-05 DIAGNOSIS — I10 ESSENTIAL HYPERTENSION: Primary | ICD-10-CM

## 2019-07-05 DIAGNOSIS — R73.02 GLUCOSE INTOLERANCE (IMPAIRED GLUCOSE TOLERANCE): ICD-10-CM

## 2019-07-05 LAB
A-G RATIO,AGRAT: 1.5 RATIO
ALBUMIN SERPL-MCNC: 4.6 G/DL (ref 3.9–5.4)
ALP SERPL-CCNC: 90 U/L (ref 38–126)
ALT SERPL-CCNC: 50 U/L (ref 9–52)
ANION GAP SERPL CALC-SCNC: 12 MMOL/L
AST SERPL W P-5'-P-CCNC: 35 U/L (ref 14–36)
BILIRUB SERPL-MCNC: 0.8 MG/DL (ref 0.2–1.3)
BUN SERPL-MCNC: 14 MG/DL (ref 9–20)
BUN/CREATININE RATIO,BUCR: 18 RATIO
CALCIUM SERPL-MCNC: 9.2 MG/DL (ref 8.4–10.2)
CHLORIDE SERPL-SCNC: 102 MMOL/L (ref 98–107)
CHOL/HDL RATIO,CHHD: 3 RATIO (ref 0–4)
CHOLEST SERPL-MCNC: 157 MG/DL (ref 0–200)
CK SERPL-CCNC: 69 U/L (ref 30–135)
CO2 SERPL-SCNC: 26 MMOL/L (ref 22–32)
CREAT SERPL-MCNC: 0.8 MG/DL (ref 0.8–1.5)
GLOBULIN,GLOB: 3.1
GLUCOSE SERPL-MCNC: 102 MG/DL (ref 75–110)
HDLC SERPL-MCNC: 50 MG/DL (ref 35–130)
LDL/HDL RATIO,LDHD: 2 RATIO
LDLC SERPL CALC-MCNC: 90 MG/DL (ref 0–130)
POTASSIUM SERPL-SCNC: 4.5 MMOL/L (ref 3.6–5)
PROT SERPL-MCNC: 7.7 G/DL (ref 6.3–8.2)
SODIUM SERPL-SCNC: 140 MMOL/L (ref 137–145)
TRIGL SERPL-MCNC: 83 MG/DL (ref 0–200)
VLDLC SERPL CALC-MCNC: 17 MG/DL

## 2019-07-05 RX ORDER — CILOSTAZOL 100 MG/1
100 TABLET ORAL
Qty: 180 TAB | Status: SHIPPED | OUTPATIENT
Start: 2019-07-05 | End: 2020-07-24 | Stop reason: SDUPTHER

## 2019-07-05 RX ORDER — TADALAFIL 20 MG/1
20 TABLET ORAL AS NEEDED
Qty: 10 TAB | Status: SHIPPED | OUTPATIENT
Start: 2019-07-05 | End: 2019-11-26 | Stop reason: SDUPTHER

## 2019-07-05 RX ORDER — NIACIN 500 MG/1
TABLET, EXTENDED RELEASE ORAL
Qty: 90 TAB | Refills: 3 | Status: SHIPPED | OUTPATIENT
Start: 2019-07-05 | End: 2020-04-20

## 2019-07-05 RX ORDER — SIMVASTATIN 80 MG/1
TABLET, FILM COATED ORAL
Qty: 90 TAB | Refills: 3 | Status: SHIPPED | OUTPATIENT
Start: 2019-07-05 | End: 2020-04-20

## 2019-07-05 NOTE — PROGRESS NOTES
Myah Matthews  Identified pt with two pt identifiers(name and ). Chief Complaint   Patient presents with    Blood Pressure Check    Cholesterol Problem    Blood sugar problem    Mole     wants to get it removed on back of neck       1. Have you been to the ER, urgent care clinic since your last visit? Hospitalized since your last visit? NO    2. Have you seen or consulted any other health care providers outside of the [address] [place] [address] since your last visit? Include any pap smears or colon screening. Yes, patient had an eye and ete brow lift done      Would you like to sign up for MyChart today, if you have not already done so? No  If not, would you like information on MyChart, and how to sign up at a later time? No      Medication reconciliation up to date and corrected with patient at this time. Today's provider has been notified of reason for visit, vitals and flowsheets obtained on patients. Reviewed record in preparation for visit, huddled with provider and have obtained necessary documentation.       Health Maintenance Due   Topic    DTaP/Tdap/Td series (1 - Tdap)    Shingrix Vaccine Age 49> (1 of 2)    GLAUCOMA SCREENING Q2Y     AAA Screening 73-67 YO Male Smoking Patients        Wt Readings from Last 3 Encounters:   19 174 lb (78.9 kg)   19 179 lb (81.2 kg)   18 172 lb 12.8 oz (78.4 kg)     Temp Readings from Last 3 Encounters:   19 98.7 °F (37.1 °C) (Oral)   19 97.6 °F (36.4 °C) (Oral)   17 97.7 °F (36.5 °C) (Oral)     BP Readings from Last 3 Encounters:   19 130/70   19 128/70   18 112/70     Pulse Readings from Last 3 Encounters:   19 65   19 80   18 72     Vitals:    19 0829   BP: 130/70   Pulse: 65   Resp: 20   Temp: 98.7 °F (37.1 °C)   TempSrc: Oral   SpO2: 97%   Weight: 174 lb (78.9 kg)   Height: 5' 8.5\" (1.74 m)   PainSc:   0 - No pain         Learning Assessment:  :     Learning Assessment 12/22/2017   PRIMARY LEARNER Patient   HIGHEST LEVEL OF EDUCATION - PRIMARY LEARNER  GRADUATED HIGH SCHOOL OR GED   BARRIERS PRIMARY LEARNER VISUAL   CO-LEARNER CAREGIVER No   PRIMARY LANGUAGE ENGLISH   LEARNER PREFERENCE PRIMARY VIDEOS   ANSWERED BY patient   RELATIONSHIP SELF       Depression Screening:  :     3 most recent PHQ Screens 5/3/2019   Little interest or pleasure in doing things Not at all   Feeling down, depressed, irritable, or hopeless Not at all   Total Score PHQ 2 0       Fall Risk Assessment:  :     Fall Risk Assessment, last 12 mths 5/3/2019   Able to walk? Yes   Fall in past 12 months? No       Abuse Screening:  :     Abuse Screening Questionnaire 7/5/2019 5/3/2019 12/21/2018 12/22/2017   Do you ever feel afraid of your partner? N N N N   Are you in a relationship with someone who physically or mentally threatens you? N N N N   Is it safe for you to go home?  Y Y Y Y       ADL Screening:  :     ADL Assessment 5/3/2019   Feeding yourself No Help Needed   Getting from bed to chair No Help Needed   Getting dressed No Help Needed   Bathing or showering No Help Needed   Walk across the room (includes cane/walker) No Help Needed   Using the telphone No Help Needed   Taking your medications No Help Needed   Preparing meals No Help Needed   Managing money (expenses/bills) No Help Needed   Moderately strenuous housework (laundry) No Help Needed   Shopping for personal items (toiletries/medicines) No Help Needed   Shopping for groceries No Help Needed   Driving No Help Needed   Climbing a flight of stairs No Help Needed   Getting to places beyond walking distances No Help Needed

## 2019-07-05 NOTE — PATIENT INSTRUCTIONS

## 2019-07-06 LAB
EST. AVERAGE GLUCOSE BLD GHB EST-MCNC: 128 MG/DL
HBA1C MFR BLD: 6.1 % (ref 4.8–5.6)

## 2019-07-29 PROBLEM — D48.5 NEOPLASM OF UNCERTAIN BEHAVIOR OF SKIN: Status: ACTIVE | Noted: 2019-07-29

## 2019-07-29 NOTE — PROGRESS NOTES
Subjective:   Eb Larsen is a 81 y.o. male      Chief Complaint   Patient presents with    Lesion     lesion removal from baack of neck        History of present illness: He presents for removal enlarging skin lesion of the back of his neck on the right side. He is noted over the past few months this is increased in size and become problematic. He currently denies any chest pain, shortness of breath, palpitations, PND, orthopnea or cardiorespiratory complaints. He notes no GI or  complaints. He denies any headaches, dizziness or neurologic complaints. No other complaints are noted.     Patient Active Problem List   Diagnosis Code    Mixed hyperlipidemia E78.2    ED (erectile dysfunction) N52.9    History of tobacco abuse Z87.891    Prostate carcinoma ([address] C61    Essential hypertension I10    Glucose intolerance (impaired glucose tolerance) R73.02    ASVD (arteriosclerotic vascular disease) I70.90    Annual physical exam Z00.00    Primary osteoarthritis involving multiple joints M15.0    Pre-operative cardiovascular examination Z01.810    Ptosis of eyelid, bilateral H02.403    Neoplasm of uncertain behavior of skin D48.5      Past Medical History:   Diagnosis Date    Arthritis of multiple sites 12/21/2017    ASVD (arteriosclerotic vascular disease) 12/21/2017    Cancer ([address]     prostate    DJD (degenerative joint disease) 12/21/2017    ED (erectile dysfunction) 12/21/2017    Glucose intolerance (impaired glucose tolerance) 12/21/2017    History of tobacco abuse 12/21/2017    HTN (hypertension) 12/21/2017    Hypercholesterolemia     Hyperlipidemia 12/21/2017    On long term drug therapy 12/21/2017    PAD (peripheral artery disease) ([address]     left leg    Prostate carcinoma (HCC) 12/21/2017      Allergies   Allergen Reactions    Bee Sting [Sting, Bee] Swelling     Swelling (local area of sting) - per COLT Wolfe in PAT    Penicillins Unknown (comments)      Family History   Problem Relation Age of Onset    Diabetes Mother     Alcohol abuse Father       Social History     Socioeconomic History    Marital status:      Spouse name: Not on file    Number of children: Not on file    Years of education: Not on file    Highest education level: Not on file   Occupational History    Not on file   Social Needs    Financial resource strain: Not on file    Food insecurity:     Worry: Not on file     Inability: Not on file    Transportation needs:     Medical: Not on file     Non-medical: Not on file   Tobacco Use    Smoking status: Former Smoker     Packs/day: 1.00     Years: 45.00     Pack years: 45.00     Last attempt to quit:      Years since quittin.5    Smokeless tobacco: Never Used   Substance and Sexual Activity    Alcohol use: Yes     Alcohol/week: 5.0 standard drinks     Types: 6 Cans of beer per week    Drug use: No    Sexual activity: Yes     Partners: Female     Birth control/protection: Surgical   Lifestyle    Physical activity:     Days per week: Not on file     Minutes per session: Not on file    Stress: Not on file   Relationships    Social connections:     Talks on phone: Not on file     Gets together: Not on file     Attends Anabaptism service: Not on file     Active member of club or organization: Not on file     Attends meetings of clubs or organizations: Not on file     Relationship status: Not on file    Intimate partner violence:     Fear of current or ex partner: Not on file     Emotionally abused: Not on file     Physically abused: Not on file     Forced sexual activity: Not on file   Other Topics Concern    Not on file   Social History Narrative    Not on file     Prior to Admission medications    Medication Sig Start Date End Date Taking? Authorizing Provider   cilostazol (PLETAL) 100 mg tablet Take 1 Tab by mouth Before breakfast and dinner.  19  Yes Dottie Meckel, MD   niacin ER (NIASPAN) 500 mg tablet TAKE 1 TABLET AT BEDTIME 19 Yes Maggie Bynum MD   simvastatin (ZOCOR) 80 mg tablet TAKE 1 TABLET DAILY 7/5/19  Yes Maggie Bynum MD   tadalafil (CIALIS) 20 mg tablet Take 1 Tab by mouth as needed (ED). 7/5/19  Yes Maggie Bynum MD   aspirin (ASPIRIN) 325 mg tablet Take 325 mg by mouth daily. Yes Provider, Historical        Review of Systems              Constitutional:  He denies fever, weight loss, sweats or fatigue. Respiratory:  No cough, wheezing or shortness of breath. No sputum production. Cardiac:  Denies chest pain, palpitations, unexplained indigestion, syncope, edema, PND or orthopnea. Skin:  No recent rashes or mole changes. Positive enlarging skin lesion right posterior neck  Neurological:   No syncope, dizziness, or other recent neurologic changes     Objective:     Vitals:    07/30/19 1444   BP: 108/72   Pulse: 93   Resp: 17   Temp: 98.6 °F (37 °C)   TempSrc: Oral   SpO2: 95%   Weight: 181 lb 3.2 oz (82.2 kg)   Height: 5' 8.5\" (1.74 m)   PainSc:   0 - No pain       Body mass index is 27.15 kg/m². Physical Examination:              General Appearance:  Well-developed, well-nourished, no acute  distress. Eyes:  Anicteric    Neck:   No JVD noted. Lungs:  Clear to auscultation and percussion. Cardiac:  Regular rate and rhythm without murmur. PMI not displaced. No gallop, rub or click. Extremities:  No edema. Skin:  No rash or unusual mole changes noted. 7 mm irregular skin lesion right posterior neck consistent with possible basal cell skin cancer. Lymph Nodes:  None felt in the cervical, supraclavicular, axillary or inguinal region. Neurological: . No focal deficits. Assessment/Plan:         1. Neoplasm of uncertain behavior of skin        Impressions/Plan:  Impression  1. Skin lesion right posterior neck after informed consent and Betadine scrub with local lidocaine anesthesia the 7 mm skin lesion is excised without difficulty.   The edges were then opposed with 2 sutures of 5-0 Ethilon which he tolerated quite well and sterilely dressed. Specimen sent for path evaluation. He will return in 7 to 8 days for suture removal.    Orders Placed This Encounter    EXC SKIN MALIG 0.6-1CM FACE,FACIAL    PATHOLOGIST REVIEW SMEARS             No results found for any visits on 07/30/19. Lynn Garcia MD    The patient was given an after visit summary and the patient verbalized an understanding of the plans and problems as explained.

## 2019-07-30 ENCOUNTER — OFFICE VISIT (OUTPATIENT)
Dept: INTERNAL MEDICINE CLINIC | Age: 69
End: 2019-07-30

## 2019-07-30 VITALS
OXYGEN SATURATION: 95 % | HEIGHT: 69 IN | TEMPERATURE: 98.6 F | BODY MASS INDEX: 26.84 KG/M2 | HEART RATE: 93 BPM | RESPIRATION RATE: 17 BRPM | WEIGHT: 181.2 LBS | SYSTOLIC BLOOD PRESSURE: 108 MMHG | DIASTOLIC BLOOD PRESSURE: 72 MMHG

## 2019-07-30 DIAGNOSIS — D48.5 NEOPLASM OF UNCERTAIN BEHAVIOR OF SKIN: Primary | ICD-10-CM

## 2019-07-30 NOTE — PROGRESS NOTES
Chief Complaint   Patient presents with    Lesion     lesion removal from ack of neck     Visit Vitals  /72 (BP 1 Location: Left arm, BP Patient Position: Sitting)   Pulse 93   Temp 98.6 °F (37 °C) (Oral)   Resp 17   Ht 5' 8.5\" (1.74 m)   Wt 181 lb 3.2 oz (82.2 kg)   SpO2 95%   BMI 27.15 kg/m²     1. Have you been to the ER, urgent care clinic since your last visit? Hospitalized since your last visit? No    2. Have you seen or consulted any other health care providers outside of the [address] [place] [address] since your last visit? Include any pap smears or colon screening.  No

## 2019-07-30 NOTE — PATIENT INSTRUCTIONS
DASH Diet: Care Instructions  Your Care Instructions    The DASH diet is an eating plan that can help lower your blood pressure. DASH stands for Dietary Approaches to Stop Hypertension. Hypertension is high blood pressure. The DASH diet focuses on eating foods that are high in calcium, potassium, and magnesium. These nutrients can lower blood pressure. The foods that are highest in these nutrients are fruits, vegetables, low-fat dairy products, nuts, seeds, and legumes. But taking calcium, potassium, and magnesium supplements instead of eating foods that are high in those nutrients does not have the same effect. The DASH diet also includes whole grains, fish, and poultry. The DASH diet is one of several lifestyle changes your doctor may recommend to lower your high blood pressure. Your doctor may also want you to decrease the amount of sodium in your diet. Lowering sodium while following the DASH diet can lower blood pressure even further than just the DASH diet alone. Follow-up care is a key part of your treatment and safety. Be sure to make and go to all appointments, and call your doctor if you are having problems. It's also a good idea to know your test results and keep a list of the medicines you take. How can you care for yourself at home? Following the DASH diet  · Eat 4 to 5 servings of fruit each day. A serving is 1 medium-sized piece of fruit, ½ cup chopped or canned fruit, 1/4 cup dried fruit, or 4 ounces (½ cup) of fruit juice. Choose fruit more often than fruit juice. · Eat 4 to 5 servings of vegetables each day. A serving is 1 cup of lettuce or raw leafy vegetables, ½ cup of chopped or cooked vegetables, or 4 ounces (½ cup) of vegetable juice. Choose vegetables more often than vegetable juice. · Get 2 to 3 servings of low-fat and fat-free dairy each day. A serving is 8 ounces of milk, 1 cup of yogurt, or 1 ½ ounces of cheese. · Eat 6 to 8 servings of grains each day.  A serving is 1 slice of bread, 1 ounce of dry cereal, or ½ cup of cooked rice, pasta, or cooked cereal. Try to choose whole-grain products as much as possible. · Limit lean meat, poultry, and fish to 2 servings each day. A serving is 3 ounces, about the size of a deck of cards. · Eat 4 to 5 servings of nuts, seeds, and legumes (cooked dried beans, lentils, and split peas) each week. A serving is 1/3 cup of nuts, 2 tablespoons of seeds, or ½ cup of cooked beans or peas. · Limit fats and oils to 2 to 3 servings each day. A serving is 1 teaspoon of vegetable oil or 2 tablespoons of salad dressing. · Limit sweets and added sugars to 5 servings or less a week. A serving is 1 tablespoon jelly or jam, ½ cup sorbet, or 1 cup of lemonade. · Eat less than 2,300 milligrams (mg) of sodium a day. If you limit your sodium to 1,500 mg a day, you can lower your blood pressure even more. Tips for success  · Start small. Do not try to make dramatic changes to your diet all at once. You might feel that you are missing out on your favorite foods and then be more likely to not follow the plan. Make small changes, and stick with them. Once those changes become habit, add a few more changes. · Try some of the following:  ? Make it a goal to eat a fruit or vegetable at every meal and at snacks. This will make it easy to get the recommended amount of fruits and vegetables each day. ? Try yogurt topped with fruit and nuts for a snack or healthy dessert. ? Add lettuce, tomato, cucumber, and onion to sandwiches. ? Combine a ready-made pizza crust with low-fat mozzarella cheese and lots of vegetable toppings. Try using tomatoes, squash, spinach, broccoli, carrots, cauliflower, and onions. ? Have a variety of cut-up vegetables with a low-fat dip as an appetizer instead of chips and dip. ? Sprinkle sunflower seeds or chopped almonds over salads. Or try adding chopped walnuts or almonds to cooked vegetables.   ? Try some vegetarian meals using beans and peas. Add garbanzo or kidney beans to salads. Make burritos and tacos with mashed borrego beans or black beans. Where can you learn more? Go to http://meghan-nicole.info/. Enter D435 in the search box to learn more about \"DASH Diet: Care Instructions. \"  Current as of: July 22, 2018  Content Version: 12.1  © 1913-6753 [employer], [employer]. Care instructions adapted under license by [employer] (which disclaims liability or warranty for this information). If you have questions about a medical condition or this instruction, always ask your healthcare professional. [address] any warranty or liability for your use of this information.

## 2019-08-02 LAB
DX ICD CODE: NORMAL
DX ICD CODE: NORMAL
PATH REPORT.FINAL DX SPEC: NORMAL
PATH REPORT.GROSS SPEC: NORMAL
PATH REPORT.SITE OF ORIGIN SPEC: NORMAL
PATHOLOGIST NAME: NORMAL
PAYMENT PROCEDURE: NORMAL

## 2019-08-07 ENCOUNTER — CLINICAL SUPPORT (OUTPATIENT)
Dept: INTERNAL MEDICINE CLINIC | Age: 69
End: 2019-08-07

## 2019-08-07 VITALS
WEIGHT: 177.4 LBS | DIASTOLIC BLOOD PRESSURE: 84 MMHG | TEMPERATURE: 98.5 F | HEIGHT: 69 IN | BODY MASS INDEX: 26.28 KG/M2 | HEART RATE: 85 BPM | RESPIRATION RATE: 18 BRPM | OXYGEN SATURATION: 95 % | SYSTOLIC BLOOD PRESSURE: 112 MMHG

## 2019-08-07 DIAGNOSIS — Z48.02 VISIT FOR SUTURE REMOVAL: Primary | ICD-10-CM

## 2019-08-09 NOTE — PROGRESS NOTES
Pt returned call  Two pt identifiers confirmed  Informed patient per Dr. Ezra Carlos that skin lesion was benign. Patient verbalized understanding of information discussed  with no further questions at this time.

## 2019-09-20 PROBLEM — Z01.810 PRE-OPERATIVE CARDIOVASCULAR EXAMINATION: Status: RESOLVED | Noted: 2019-05-02 | Resolved: 2019-09-20

## 2019-11-26 RX ORDER — TADALAFIL 20 MG/1
TABLET, FILM COATED ORAL
Qty: 18 TAB | Refills: 5 | Status: SHIPPED | OUTPATIENT
Start: 2019-11-26 | End: 2021-01-22 | Stop reason: ALTCHOICE

## 2019-11-26 NOTE — TELEPHONE ENCOUNTER
RX refill request from the patient/pharmacy. Patient last seen 08- with labs, and next appt. scheduled for 12-  Requested Prescriptions     Pending Prescriptions Disp Refills    CIALIS 20 mg tablet [Pharmacy Med Name: CIALIS 20MG TABLET] 18 Tab 5     Sig: TAKE 1 TABLET AS NEEDED   .

## 2019-12-19 NOTE — PROGRESS NOTES
HPI:   81 y.o.  presents for comprehensive annual healthcare examination and follow up appointment. No hospital, ER or specialist visits since last primary care visit except as noted below. He is regular followed for hypertension, glucose intolerance, hyperlipidemia, DJD, prostate carcinoma status post prostatectomy, ASVD and other medical problems. He is taking his medications and trying to follow his diet and trying to get some exercise on a regular basis. He currently denies any chest pain, shortness of breath, palpitations, PND, orthopnea or other cardiorespiratory complaints. He notes no GI or  complaints. He notes no headaches, dizziness or neurologic complaints. There are no current arthritic complaints and no other complaints on complete review of systems.     Patient Active Problem List    Diagnosis    Primary osteoarthritis involving multiple joints    Essential hypertension    Glucose intolerance (impaired glucose tolerance)    Mixed hyperlipidemia    Neoplasm of uncertain behavior of skin    Ptosis of eyelid, bilateral    Annual physical exam    ED (erectile dysfunction)    History of tobacco abuse    Pro[facility] carcinoma ([facility]    ASVD (arteriosclerotic vascular disease)       Past Medical History:   Diagnosis Date    Arthritis of multiple sites 12/21/2017    ASVD (arteriosclerotic vascular disease) 12/21/2017    Cancer ([facility]     prostate    DJD (degenerative joint disease) 12/21/2017    ED (erectile dysfunction) 12/21/2017    Glucose intolerance (impaired glucose tolerance) 12/21/2017    History of tobacco abuse 12/21/2017    HTN (hypertension) 12/21/2017    Hypercholesterolemia     Hyperlipidemia 12/21/2017    On long term drug therapy 12/21/2017    PAD (peripheral artery disease) ([facility]     left leg    Prostate carcinoma ([facility] [facility] 12/21/2017       Social History     Tobacco Use    Smoking status: Former Smoker     Packs/day: 1.00     Years: 45.00     Pack years: 45.00 Last attempt to quit: 2012     Years since quittin.9    Smokeless tobacco: Never Used   Substance Use Topics    Alcohol use: Yes     Alcohol/week: 5.0 standard drinks     Types: 6 Cans of beer per week    Drug use: No       Family History   Problem Relation Age of Onset    Diabetes Mother     Alcohol abuse Father            Allergies   Allergen Reactions    Bee Sting [Sting, Bee] Swelling     Swelling (local area of sting) - per RN Skeet Golder in PAT    Penicillins Unknown (comments)       ROS:     Constitutional: He denies fevers, weight loss, sweats. Eyes: No blurred or double vision. ENT: No difficulty with swallowing, taste, speech or smell. Neck: no stiffness or swelling  Respiratory: No cough wheezing or shortness of breath. Cardiovascular: Denies chest pain, palpitations, unexplained indigestion or syncope. Gastrointestinal:  No changes in bowel movements, no abdominal pain, no bloating. Genitourinary:  He denies frequency, nocturia or stranguria. Extremities: No joint pain, stiffness or swelling. Neurological:  No numbness, tingling, burring paresthesias or loss of motor strength. No syncope, dizziness or frequent headache  Lymphatic: no adenopathy noted  Hematologic: no easy bruising or bleeding gums  Skin:  No recent rashes or mole changes. Psychiatric/Behavioral:  Negative for depression. The patient is not nervous/anxious. PE:     Vitals:    19 0824   BP: 128/88   Pulse: 71   Resp: 18   Temp: 98.1 °F (36.7 °C)   TempSrc: Oral   SpO2: 96%   Weight: 179 lb 11.2 oz (81.5 kg)   Height: 5' 8.5\" (1.74 m)   PainSc:   0 - No pain        General appearance - alert, well appearing, and in no distress  Mental status - alert, oriented to person, place, and time  HEENT:  Ears - bilateral TM's and external ear canals clear  Eyes - pupillary responses were normal.  Extraocular muscle function intact. Lids and conjunctiva not injected. Fundoscopic exam revealed sharp disc margins. eye movements intact  Pharynx- clear with teeth in good repair. No masses were noted  Neck - supple without thyromegaly or burit. No JVD noted  Lungs - clear to auscultation and percussion  Cardiac- normal rate, regular rhythm without murmurs. PMI not displaced. No gallop, rub or click  Abdomen - flat, soft, non-tender without palpable organomegaly or mass. No pulsatile mass was felt, and not bruit was heard. Bowel sounds were active  : Circumcised, Testes descended w/o masses  Rectal: normal sphincter tone, prostate fossa empty status post prostatectomy, no masses, stool brown and hemacult negative  Extremities -  no clubbing cyanosis or edema  Lymphatics - no palpable lymphadenopathy, no hepatosplenomegaly  Hematologic: no petechiae or purpura  Peripheral vascular -Femoral, Dorsalis pedis and posterior tibial pulses felt without difficulty  Skin - no rash or unusual mole change noted  Neurological - Cranial nerves II-XII grossly intact. Motor strength 5/5. DTR's 2+ and symmetric. Station and gait normal  Back exam - full range of motion, no tenderness, palpable spasm or pain on motion  Musculoskeletal - no joint tenderness, deformity or swelling      Assessment/Plan:     1. Annual physical exam    2. Essential hypertension    3. Glucose intolerance (impaired glucose tolerance)    4. Mixed hyperlipidemia    5. Primary osteoarthritis involving multiple joints    6. Prostate carcinoma ([address]    7. ASVD (arteriosclerotic vascular disease)        Impression  1. Annual physical examination is normal  2. Hypertension is controlled so continue current therapy reviewed with him  3. Glucose intolerance repeat status pending a prior lab reviewed no make adjustments if necessary. 4   Hyperlipidemia prior lab reviewed and repeat status pending I will adjust if needed. 5   DJD that is stable  6   Prostate carcinoma status post prostatectomy no evidence recurrence on exam PSA pending  7.   ASCVD clinically stable continue aspirin daily  I will call with lab and make further recommendations or adjustments if necessary. Follow-up scheduled again for 6 months or sooner if there is a problem. Health Maintenance reviewed - updated. Orders Placed This Encounter    CBC WITH AUTOMATED DIFF    METABOLIC PANEL, COMPREHENSIVE ([facility])    LIPID PANEL ([facility])    CK ([facility])    HEMOGLOBIN A1C W/O EAG ([facility])    T4, FREE ([facility])    TSH 3RD GENERATION ([facility])    PSA SCREENING (SCREENING) ([facility])    URINALYSIS W/O MICRO ([facility])       There are no discontinued medications. Current Outpatient Medications   Medication Sig Dispense Refill    CIALIS 20 mg tablet TAKE 1 TABLET AS NEEDED 18 Tab 5    cilostazol (PLETAL) 100 mg tablet Take 1 Tab by mouth Before breakfast and dinner. 180 Tab prn    niacin ER (NIASPAN) 500 mg tablet TAKE 1 TABLET AT BEDTIME 90 Tab 3    simvastatin (ZOCOR) 80 mg tablet TAKE 1 TABLET DAILY 90 Tab 3    aspirin (ASPIRIN) 325 mg tablet Take 325 mg by mouth daily. No results found for any visits on 12/20/19. Recommended healthy diet low in carbohydrates, fats, sodium and cholesterol. Recommended regular cardiovascular exercise 3-6 times per week for 30-60 minutes daily. Verbal and written instructions (see AVS) provided. Patient expresses understanding of diagnosis and treatment plan.       Shreyas Guillermo MD

## 2019-12-20 ENCOUNTER — OFFICE VISIT (OUTPATIENT)
Dept: INTERNAL MEDICINE CLINIC | Age: 69
End: 2019-12-20

## 2019-12-20 VITALS
HEIGHT: 69 IN | HEART RATE: 71 BPM | OXYGEN SATURATION: 96 % | TEMPERATURE: 98.1 F | RESPIRATION RATE: 18 BRPM | BODY MASS INDEX: 26.62 KG/M2 | DIASTOLIC BLOOD PRESSURE: 88 MMHG | WEIGHT: 179.7 LBS | SYSTOLIC BLOOD PRESSURE: 128 MMHG

## 2019-12-20 DIAGNOSIS — I10 ESSENTIAL HYPERTENSION: ICD-10-CM

## 2019-12-20 DIAGNOSIS — E78.2 MIXED HYPERLIPIDEMIA: ICD-10-CM

## 2019-12-20 DIAGNOSIS — C61 PROSTATE CARCINOMA (HCC): ICD-10-CM

## 2019-12-20 DIAGNOSIS — I70.90 ASVD (ARTERIOSCLEROTIC VASCULAR DISEASE): ICD-10-CM

## 2019-12-20 DIAGNOSIS — R73.02 GLUCOSE INTOLERANCE (IMPAIRED GLUCOSE TOLERANCE): ICD-10-CM

## 2019-12-20 DIAGNOSIS — Z00.00 ANNUAL PHYSICAL EXAM: Primary | ICD-10-CM

## 2019-12-20 DIAGNOSIS — M15.9 PRIMARY OSTEOARTHRITIS INVOLVING MULTIPLE JOINTS: ICD-10-CM

## 2019-12-20 LAB
BILIRUB UR QL: NEGATIVE
CLARITY: CLEAR
COLOR UR: NORMAL
GLUCOSE 24H UR-MRATE: NEGATIVE G/(24.H)
HGB UR QL STRIP: NEGATIVE
KETONES UR QL STRIP.AUTO: NEGATIVE
LEUKOCYTE ESTERASE: NEGATIVE
NITRITE UR QL STRIP.AUTO: NEGATIVE
PH UR STRIP: 6 [PH] (ref 5–7)
PROT UR STRIP-MCNC: NEGATIVE MG/DL
SP GR UR REFRACTOMETRY: 1.01 (ref 1–1.03)
UROBILINOGEN UR QL STRIP.AUTO: NEGATIVE

## 2019-12-20 NOTE — PROGRESS NOTES
Chief Complaint   Patient presents with   [facility] Annual Wellness Visit     Visit Vitals  /88 (BP 1 Location: Left arm, BP Patient Position: Sitting)   Pulse 71   Temp 98.1 °F (36.7 °C) (Oral)   Resp 18   Ht 5' 8.5\" (1.74 m)   Wt 179 lb 11.2 oz (81.5 kg)   SpO2 96%   BMI 26.93 kg/m²     1. Have you been to the ER, urgent care clinic since your last visit? Hospitalized since your last visit? No    2. Have you seen or consulted any other health care providers outside of the [address] [place] [address] since your last visit? Include any pap smears or colon screening.  No

## 2019-12-20 NOTE — PATIENT INSTRUCTIONS

## 2019-12-30 LAB
A-G RATIO,AGRAT: 1.2 RATIO
ALBUMIN SERPL-MCNC: 4.1 G/DL (ref 3.9–5.4)
ALP SERPL-CCNC: 80 U/L (ref 38–126)
ALT SERPL-CCNC: 45 U/L (ref 0–50)
ANION GAP SERPL CALC-SCNC: 14 MMOL/L
AST SERPL W P-5'-P-CCNC: 33 U/L (ref 14–36)
BILIRUB SERPL-MCNC: 0.6 MG/DL (ref 0.2–1.3)
BUN SERPL-MCNC: 14 MG/DL (ref 9–20)
BUN/CREATININE RATIO,BUCR: 18 RATIO
CALCIUM SERPL-MCNC: 9.3 MG/DL (ref 8.4–10.2)
CHLORIDE SERPL-SCNC: 100 MMOL/L (ref 98–107)
CHOL/HDL RATIO,CHHD: 3 RATIO (ref 0–4)
CHOLEST SERPL-MCNC: 162 MG/DL (ref 0–200)
CK SERPL-CCNC: 127 U/L (ref 30–135)
CO2 SERPL-SCNC: 24 MMOL/L (ref 22–32)
CREAT SERPL-MCNC: 0.8 MG/DL (ref 0.8–1.5)
GLOBULIN,GLOB: 3.3
GLUCOSE SERPL-MCNC: 111 MG/DL (ref 75–110)
HBA1C MFR BLD HPLC: 5.6 % (ref 4–5.7)
HDLC SERPL-MCNC: 49 MG/DL (ref 35–130)
LDL/HDL RATIO,LDHD: 2 RATIO
LDLC SERPL CALC-MCNC: 93 MG/DL (ref 0–130)
POTASSIUM SERPL-SCNC: 4.6 MMOL/L (ref 3.6–5)
PROT SERPL-MCNC: 7.4 G/DL (ref 6.3–8.2)
SODIUM SERPL-SCNC: 138 MMOL/L (ref 137–145)
TRIGL SERPL-MCNC: 100 MG/DL (ref 0–200)
VLDLC SERPL CALC-MCNC: 20 MG/DL

## 2019-12-31 LAB
BASOPHILS # BLD AUTO: 0 X10E3/UL (ref 0–0.2)
BASOPHILS NFR BLD AUTO: 0 %
EOSINOPHIL # BLD AUTO: 0.2 X10E3/UL (ref 0–0.4)
EOSINOPHIL NFR BLD AUTO: 2 %
ERYTHROCYTE [DISTWIDTH] IN BLOOD BY AUTOMATED COUNT: 13.3 % (ref 12.3–15.4)
HCT VFR BLD AUTO: 42.6 % (ref 37.5–51)
HGB BLD-MCNC: 14.6 G/DL (ref 13–17.7)
IMM GRANULOCYTES # BLD AUTO: 0 X10E3/UL (ref 0–0.1)
IMM GRANULOCYTES NFR BLD AUTO: 0 %
LYMPHOCYTES # BLD AUTO: 2.2 X10E3/UL (ref 0.7–3.1)
LYMPHOCYTES NFR BLD AUTO: 21 %
MCH RBC QN AUTO: 30.2 PG (ref 26.6–33)
MCHC RBC AUTO-ENTMCNC: 34.3 G/DL (ref 31.5–35.7)
MCV RBC AUTO: 88 FL (ref 79–97)
MONOCYTES # BLD AUTO: 0.7 X10E3/UL (ref 0.1–0.9)
MONOCYTES NFR BLD AUTO: 7 %
NEUTROPHILS # BLD AUTO: 7.4 X10E3/UL (ref 1.4–7)
NEUTROPHILS NFR BLD AUTO: 70 %
PLATELET # BLD AUTO: 304 X10E3/UL (ref 150–450)
PSA, TEST22: 0 NG/ML (ref 0–4)
RBC # BLD AUTO: 4.83 X10E6/UL (ref 4.14–5.8)
T4 FREE SERPL-MCNC: 0.84 NG/DL (ref 0.58–2.3)
TSH SERPL DL<=0.05 MIU/L-ACNC: 2.26 UIU/ML (ref 0.34–5.6)
WBC # BLD AUTO: 10.5 X10E3/UL (ref 3.4–10.8)

## 2020-04-20 DIAGNOSIS — E78.2 MIXED HYPERLIPIDEMIA: ICD-10-CM

## 2020-04-20 RX ORDER — SIMVASTATIN 80 MG/1
TABLET, FILM COATED ORAL
Qty: 90 TAB | Refills: 3 | Status: SHIPPED | OUTPATIENT
Start: 2020-04-20 | End: 2021-01-21 | Stop reason: SDUPTHER

## 2020-04-20 RX ORDER — NIACIN 500 MG/1
TABLET, EXTENDED RELEASE ORAL
Qty: 90 TAB | Refills: 3 | Status: SHIPPED | OUTPATIENT
Start: 2020-04-20 | End: 2021-01-21 | Stop reason: SDUPTHER

## 2020-04-20 NOTE — TELEPHONE ENCOUNTER
RX refill request from the patient/pharmacy. Patient last seen 12- with labs, and next appt. scheduled for 07-  Requested Prescriptions     Pending Prescriptions Disp Refills    niacin ER (Niaspan) 500 mg tablet [Pharmacy Med Name: NIASPAN TAB 500MG ER] 90 Tab 3     Sig: TAKE 1 TABLET AT BEDTIME    simvastatin (Zocor) 80 mg tablet [Pharmacy Med Name: ZOCOR        TAB 80MG] 90 Tab 3     Sig: TAKE 1 TABLET DAILY   .

## 2020-07-23 NOTE — PROGRESS NOTES
Chief Complaint   Patient presents with    Hypertension     7 month check-up    Cholesterol Problem       SUBJECTIVE:    Brandon Mcdaniel is a 78 y.o. male who returns in follow-up for his medical problems to include hypertension, hyperlipidemia, ASVD, glucose intolerance, prostate carcinoma, DJD and other medical problems. He is taking his medications and trying to follow his diet and try and remain physically active. He currently denies any chest pain, shortness of breath, palpitations, PND, orthopnea or other cardiorespiratory complaints. He notes no GI or  complaints. He notes no headaches, dizziness or neurologic complaints. There are no current active arthritic complaints and there are no other complaints on complete review of systems. Current Outpatient Medications   Medication Sig Dispense Refill    cilostazoL (PLETAL) 100 mg tablet Take 1 Tab by mouth Before breakfast and dinner. 180 Tab prn    niacin ER (Niaspan) 500 mg tablet TAKE 1 TABLET AT BEDTIME 90 Tab 3    simvastatin (Zocor) 80 mg tablet TAKE 1 TABLET DAILY 90 Tab 3    CIALIS 20 mg tablet TAKE 1 TABLET AS NEEDED 18 Tab 5    aspirin (ASPIRIN) 325 mg tablet Take 325 mg by mouth daily.        Past Medical History:   Diagnosis Date    Arthritis of multiple sites 12/21/2017    ASVD (arteriosclerotic vascular disease) 12/21/2017    Cancer ([facility]     prostate    DJD (degenerative joint disease) 12/21/2017    ED (erectile dysfunction) 12/21/2017    Glucose intolerance (impaired glucose tolerance) 12/21/2017    History of tobacco abuse 12/21/2017    HTN (hypertension) 12/21/2017    Hypercholesterolemia     Hyperlipidemia 12/21/2017    On long term drug therapy 12/21/2017    PAD (peripheral artery disease) ([facility])     left leg    Prostate carcinoma ([facility] 12/21/2017     Past Surgical History:   Procedure Laterality Date    HX GI      HX HEMORRHOIDECTOMY      VASCULAR SURGERY PROCEDURE UNLIST      left leg stent     Allergies Allergen Reactions    Bee Sting [Sting, Bee] Swelling     Swelling (local area of sting) - per RN Josesito Bird in PAT    Penicillins Unknown (comments)       REVIEW OF SYSTEMS:  General: negative for - chills or fever, or weight loss or gain  ENT: negative for - headaches, nasal congestion or tinnitus  Eyes: no blurred or visual changes  Neck: No stiffness or swollen nodes  Respiratory: negative for - cough, hemoptysis, shortness of breath or wheezing  Cardiovascular : negative for - chest pain, edema, palpitations or shortness of breath  Gastrointestinal: negative for - abdominal pain, blood in stools, heartburn or nausea/vomiting  Genito-Urinary: no dysuria, trouble voiding, or hematuria  Musculoskeletal: negative for - gait disturbance, joint pain, joint stiffness or joint swelling  Neurological: no TIA or stroke symptoms  Hematologic: no bruises, no bleeding  Lymphatic: no swollen glands  Integument: no lumps, mole changes, nail changes or rash  Endocrine:no malaise/lethargy poly uria or polydipsia or unexpected weight changes        Social History     Socioeconomic History    Marital status:      Spouse name: Not on file    Number of children: Not on file    Years of education: Not on file    Highest education level: Not on file   Tobacco Use    Smoking status: Former Smoker     Packs/day: 1.00     Years: 45.00     Pack years: 45.00     Last attempt to quit:      Years since quittin.5    Smokeless tobacco: Never Used   Substance and Sexual Activity    Alcohol use:  Yes     Alcohol/week: 5.0 standard drinks     Types: 6 Cans of beer per week    Drug use: No    Sexual activity: Yes     Partners: Female     Birth control/protection: Surgical     Family History   Problem Relation Age of Onset    Diabetes Mother     Alcohol abuse Father        OBJECTIVE:     Visit Vitals  /78 (BP 1 Location: Right arm, BP Patient Position: Sitting)   Pulse 60   Temp 98.5 °F (36.9 °C) (Oral)   Ht 5' 8\" (1.727 m)   Wt 178 lb 12.8 oz (81.1 kg)   SpO2 98%   BMI 27.19 kg/m²     CONSTITUTIONAL:   well nourished, appears age appropriate  EYES: sclera anicteric, PERRL, EOMI  ENMT:nares clear, moist mucous membranes, pharynx clear  NECK: supple. Thyroid normal, No JVD or bruits  RESPIRATORY: Chest: clear to ascultation and percussion, normal inspiratory effort  CARDIOVASCULAR: Heart: regular rate and rhythm no murmurs, rubs or gallops, PMI not displaced, No thrills, no peripheral edema  GASTROINTESTINAL: Abdomen: non distended, soft, non tender, bowel sounds normal  HEMATOLOGIC: no purpura, petechiae or bruising  LYMPHATIC: No lymph node enlargemant  MUSCULOSKELETAL: Extremities: no active synovitis, pulse 1+   INTEGUMENT: No unusual rashes or suspicious skin lesions noted. Nails appear normal.  PERIPHERAL VASCULAR: normal pulses femoral, PT and DP  NEUROLOGIC: non-focal exam, A & O X 3  PSYCHIATRIC:, appropriate affect     ASSESSMENT:   1. Essential hypertension    2. Glucose intolerance (impaired glucose tolerance)    3. Mixed hyperlipidemia    4. Primary osteoarthritis involving multiple joints    5. Prostate carcinoma ([address]    6. History of tobacco abuse    7. ASVD (arteriosclerotic vascular disease)      Impression  1. Hypertension that is controlled so continue current therapy reviewed with him. 2.  Glucose intolerance repeat status pending a prior lab review not make adjustments if necessary. 3   Hyperlipidemia prior lab reviewed and repeat status pending I will adjust if needed. 4. DJD that is stable   5   Prostate carcinoma no evidence recurrence  6. History of tobacco abuse she remains tobacco free  7. ASVD continue Pletal which are renewed  I will call with lab and make further recommendations or adjustments if necessary. Follow-up in December for his yearly or sooner if there is a problem.     PLAN:  .  Orders Placed This Encounter    METABOLIC PANEL, COMPREHENSIVE ([facility])    LIPID PANEL ([facility])    CK ([facility])    HEMOGLOBIN A1C W/O EAG ([facility])    cilostazoL (PLETAL) 100 mg tablet         ATTENTION:   This medical record was transcribed using an electronic medical records system. Although proofread, it may and can contain electronic and spelling errors. Other human spelling and other errors may be present. Corrections may be executed at a later time. Please feel free to contact us for any clarifications as needed. Follow-up and Dispositions    · Return in about 6 months (around 1/24/2021). No results found for any visits on 07/24/20. Mariel Villalobos MD    The patient verbalized understanding of the problems and plans as explained.

## 2020-07-24 ENCOUNTER — OFFICE VISIT (OUTPATIENT)
Dept: INTERNAL MEDICINE CLINIC | Age: 70
End: 2020-07-24

## 2020-07-24 VITALS
TEMPERATURE: 98.5 F | OXYGEN SATURATION: 98 % | SYSTOLIC BLOOD PRESSURE: 135 MMHG | DIASTOLIC BLOOD PRESSURE: 78 MMHG | HEIGHT: 68 IN | WEIGHT: 178.8 LBS | BODY MASS INDEX: 27.1 KG/M2 | HEART RATE: 60 BPM

## 2020-07-24 DIAGNOSIS — R73.02 GLUCOSE INTOLERANCE (IMPAIRED GLUCOSE TOLERANCE): ICD-10-CM

## 2020-07-24 DIAGNOSIS — M15.9 PRIMARY OSTEOARTHRITIS INVOLVING MULTIPLE JOINTS: ICD-10-CM

## 2020-07-24 DIAGNOSIS — Z87.891 HISTORY OF TOBACCO ABUSE: ICD-10-CM

## 2020-07-24 DIAGNOSIS — E78.2 MIXED HYPERLIPIDEMIA: ICD-10-CM

## 2020-07-24 DIAGNOSIS — I10 ESSENTIAL HYPERTENSION: Primary | ICD-10-CM

## 2020-07-24 DIAGNOSIS — I70.90 ASVD (ARTERIOSCLEROTIC VASCULAR DISEASE): ICD-10-CM

## 2020-07-24 DIAGNOSIS — C61 PROSTATE CARCINOMA (HCC): ICD-10-CM

## 2020-07-24 LAB
A-G RATIO,AGRAT: 1.4 RATIO
ALBUMIN SERPL-MCNC: 4.4 G/DL (ref 3.9–5.4)
ALP SERPL-CCNC: 80 U/L (ref 38–126)
ALT SERPL-CCNC: 68 U/L (ref 0–50)
ANION GAP SERPL CALC-SCNC: 11 MMOL/L
AST SERPL W P-5'-P-CCNC: 46 U/L (ref 14–36)
BILIRUB SERPL-MCNC: 0.5 MG/DL (ref 0.2–1.3)
BUN SERPL-MCNC: 13 MG/DL (ref 9–20)
BUN/CREATININE RATIO,BUCR: 19 RATIO
CALCIUM SERPL-MCNC: 9.3 MG/DL (ref 8.4–10.2)
CHLORIDE SERPL-SCNC: 106 MMOL/L (ref 98–107)
CK SERPL-CCNC: 393 U/L (ref 30–135)
CO2 SERPL-SCNC: 25 MMOL/L (ref 22–32)
CREAT SERPL-MCNC: 0.7 MG/DL (ref 0.8–1.5)
GLOBULIN,GLOB: 3.1
GLUCOSE SERPL-MCNC: 91 MG/DL (ref 75–110)
HBA1C MFR BLD HPLC: 6 % (ref 4–5.7)
POTASSIUM SERPL-SCNC: 4.6 MMOL/L (ref 3.6–5)
PROT SERPL-MCNC: 7.5 G/DL (ref 6.3–8.2)
SODIUM SERPL-SCNC: 142 MMOL/L (ref 137–145)

## 2020-07-24 RX ORDER — CILOSTAZOL 100 MG/1
100 TABLET ORAL
Qty: 180 TAB | Status: SHIPPED | OUTPATIENT
Start: 2020-07-24 | End: 2021-01-21 | Stop reason: SDUPTHER

## 2020-07-24 NOTE — PATIENT INSTRUCTIONS
High Blood Pressure: Care Instructions  Overview     It's normal for blood pressure to go up and down throughout the day. But if it stays up, you have high blood pressure. Another name for high blood pressure is hypertension. Despite what a lot of people think, high blood pressure usually doesn't cause headaches or make you feel dizzy or lightheaded. It usually has no symptoms. But it does increase your risk of stroke, heart attack, and other problems. You and your doctor will talk about your risks of these problems based on your blood pressure. Your doctor will give you a goal for your blood pressure. Your goal will be based on your health and your age. Lifestyle changes, such as eating healthy and being active, are always important to help lower blood pressure. You might also take medicine to reach your blood pressure goal.  Follow-up care is a key part of your treatment and safety. Be sure to make and go to all appointments, and call your doctor if you are having problems. It's also a good idea to know your test results and keep a list of the medicines you take. How can you care for yourself at home? Medical treatment  · If you stop taking your medicine, your blood pressure will go back up. You may take one or more types of medicine to lower your blood pressure. Be safe with medicines. Take your medicine exactly as prescribed. Call your doctor if you think you are having a problem with your medicine. · Talk to your doctor before you start taking aspirin every day. Aspirin can help certain people lower their risk of a heart attack or stroke. But taking aspirin isn't right for everyone, because it can cause serious bleeding. · See your doctor regularly. You may need to see the doctor more often at first or until your blood pressure comes down. · If you are taking blood pressure medicine, talk to your doctor before you take decongestants or anti-inflammatory medicine, such as ibuprofen.  Some of these medicines can raise blood pressure. · Learn how to check your blood pressure at home. Lifestyle changes  · Stay at a healthy weight. This is especially important if you put on weight around the waist. Losing even 10 pounds can help you lower your blood pressure. · If your doctor recommends it, get more exercise. Walking is a good choice. Bit by bit, increase the amount you walk every day. Try for at least 30 minutes on most days of the week. You also may want to swim, bike, or do other activities. · Avoid or limit alcohol. Talk to your doctor about whether you can drink any alcohol. · Try to limit how much sodium you eat to less than 2,300 milligrams (mg) a day. Your doctor may ask you to try to eat less than 1,500 mg a day. · Eat plenty of fruits (such as bananas and oranges), vegetables, legumes, whole grains, and low-fat dairy products. · Lower the amount of saturated fat in your diet. Saturated fat is found in animal products such as milk, cheese, and meat. Limiting these foods may help you lose weight and also lower your risk for heart disease. · Do not smoke. Smoking increases your risk for heart attack and stroke. If you need help quitting, talk to your doctor about stop-smoking programs and medicines. These can increase your chances of quitting for good. When should you call for help? Call  911 anytime you think you may need emergency care. This may mean having symptoms that suggest that your blood pressure is causing a serious heart or blood vessel problem. Your blood pressure may be over 180/120. For example, call 911 if:  · You have symptoms of a heart attack. These may include:  ? Chest pain or pressure, or a strange feeling in the chest.  ? Sweating. ? Shortness of breath. ? Nausea or vomiting. ? Pain, pressure, or a strange feeling in the back, neck, jaw, or upper belly or in one or both shoulders or arms. ? Lightheadedness or sudden weakness. ? A fast or irregular heartbeat.   · You have symptoms of a stroke. These may include:  ? Sudden numbness, tingling, weakness, or loss of movement in your face, arm, or leg, especially on only one side of your body. ? Sudden vision changes. ? Sudden trouble speaking. ? Sudden confusion or trouble understanding simple statements. ? Sudden problems with walking or balance. ? A sudden, severe headache that is different from past headaches. · You have severe back or belly pain. Do not wait until your blood pressure comes down on its own. Get help right away. Call your doctor now or seek immediate care if:  · Your blood pressure is much higher than normal (such as 180/120 or higher), but you don't have symptoms. · You think high blood pressure is causing symptoms, such as:  ? Severe headache.  ? Blurry vision. Watch closely for changes in your health, and be sure to contact your doctor if:  · Your blood pressure measures higher than your doctor recommends at least 2 times. That means the top number is higher or the bottom number is higher, or both. · You think you may be having side effects from your blood pressure medicine. Where can you learn more? Go to http://meghan-nicole.info/  Enter [id number] in the search box to learn more about \"High Blood Pressure: Care Instructions. \"  Current as of: December 16, 2019               Content Version: 12.5  © 0488-6272 [employer]. Care instructions adapted under license by [employer] (which disclaims liability or warranty for this information). If you have questions about a medical condition or this instruction, always ask your healthcare professional. [address] any warranty or liability for your use of this information.

## 2020-07-24 NOTE — PROGRESS NOTES
Chief Complaint   Patient presents with    Hypertension     7 month check-up    Cholesterol Problem       1. Have you been to the ER, urgent care clinic since your last visit? Hospitalized since your last visit? No    2. Have you seen or consulted any other health care providers outside of the [address] [place] [address] since your last visit? Include any pap smears or colon screening.  No

## 2020-07-25 LAB
CHOLEST SERPL-MCNC: 155 MG/DL (ref 100–199)
HDLC SERPL-MCNC: 38 MG/DL
LDLC SERPL CALC-MCNC: 98 MG/DL (ref 0–99)
TRIGL SERPL-MCNC: 93 MG/DL (ref 0–149)
VLDLC SERPL CALC-MCNC: 19 MG/DL (ref 5–40)

## 2020-11-06 ENCOUNTER — TRANSCRIBE ORDER (OUTPATIENT)
Dept: SCHEDULING | Age: 70
End: 2020-11-06

## 2020-11-06 DIAGNOSIS — I65.29: Primary | ICD-10-CM

## 2020-11-13 ENCOUNTER — HOSPITAL ENCOUNTER (OUTPATIENT)
Dept: CT IMAGING | Age: 70
Discharge: HOME | End: 2020-11-13
Attending: SURGERY
Payer: COMMERCIAL

## 2020-11-13 DIAGNOSIS — I65.29: ICD-10-CM

## 2020-11-13 LAB — CREAT BLD-MCNC: 0.9 MG/DL (ref 0.6–1.3)

## 2020-11-13 PROCEDURE — 82565 ASSAY OF CREATININE: CPT

## 2020-11-13 PROCEDURE — 70498 CT ANGIOGRAPHY NECK: CPT

## 2020-11-13 PROCEDURE — 74011000636 HC RX REV CODE- 636: Performed by: SURGERY

## 2020-11-13 RX ORDER — SODIUM CHLORIDE 0.9 % (FLUSH) 0.9 %
10 SYRINGE (ML) INJECTION
Status: COMPLETED | OUTPATIENT
Start: 2020-11-13 | End: 2020-11-13

## 2020-11-13 RX ADMIN — Medication 10 ML: at 09:17

## 2020-11-13 RX ADMIN — IOPAMIDOL 100 ML: 755 INJECTION, SOLUTION INTRAVENOUS at 09:17

## 2021-01-21 ENCOUNTER — OFFICE VISIT (OUTPATIENT)
Dept: INTERNAL MEDICINE CLINIC | Age: 71
End: 2021-01-21
Payer: MEDICARE

## 2021-01-21 VITALS
HEART RATE: 67 BPM | HEIGHT: 68 IN | TEMPERATURE: 98.4 F | BODY MASS INDEX: 26.98 KG/M2 | SYSTOLIC BLOOD PRESSURE: 126 MMHG | RESPIRATION RATE: 16 BRPM | WEIGHT: 178 LBS | OXYGEN SATURATION: 98 % | DIASTOLIC BLOOD PRESSURE: 76 MMHG

## 2021-01-21 DIAGNOSIS — E78.2 MIXED HYPERLIPIDEMIA: ICD-10-CM

## 2021-01-21 DIAGNOSIS — Z13.39 ALCOHOL SCREENING: ICD-10-CM

## 2021-01-21 DIAGNOSIS — R73.02 GLUCOSE INTOLERANCE (IMPAIRED GLUCOSE TOLERANCE): ICD-10-CM

## 2021-01-21 DIAGNOSIS — Z00.00 WELCOME TO MEDICARE PREVENTIVE VISIT: ICD-10-CM

## 2021-01-21 DIAGNOSIS — I70.90 ASVD (ARTERIOSCLEROTIC VASCULAR DISEASE): ICD-10-CM

## 2021-01-21 DIAGNOSIS — I10 ESSENTIAL HYPERTENSION: Primary | ICD-10-CM

## 2021-01-21 DIAGNOSIS — M15.9 PRIMARY OSTEOARTHRITIS INVOLVING MULTIPLE JOINTS: ICD-10-CM

## 2021-01-21 DIAGNOSIS — C61 PROSTATE CARCINOMA (HCC): ICD-10-CM

## 2021-01-21 PROBLEM — Z12.5 PROSTATE CANCER SCREENING: Status: RESOLVED | Noted: 2021-01-21 | Resolved: 2021-01-21

## 2021-01-21 PROBLEM — Z12.5 PROSTATE CANCER SCREENING: Status: ACTIVE | Noted: 2021-01-21

## 2021-01-21 PROCEDURE — 84153 ASSAY OF PSA TOTAL: CPT | Performed by: INTERNAL MEDICINE

## 2021-01-21 PROCEDURE — 1101F PT FALLS ASSESS-DOCD LE1/YR: CPT | Performed by: INTERNAL MEDICINE

## 2021-01-21 PROCEDURE — G0403 EKG FOR INITIAL PREVENT EXAM: HCPCS | Performed by: INTERNAL MEDICINE

## 2021-01-21 PROCEDURE — G8432 DEP SCR NOT DOC, RNG: HCPCS | Performed by: INTERNAL MEDICINE

## 2021-01-21 PROCEDURE — 83036 HEMOGLOBIN GLYCOSYLATED A1C: CPT | Performed by: INTERNAL MEDICINE

## 2021-01-21 PROCEDURE — 81003 URINALYSIS AUTO W/O SCOPE: CPT | Performed by: INTERNAL MEDICINE

## 2021-01-21 PROCEDURE — G8419 CALC BMI OUT NRM PARAM NOF/U: HCPCS | Performed by: INTERNAL MEDICINE

## 2021-01-21 PROCEDURE — 80061 LIPID PANEL: CPT | Performed by: INTERNAL MEDICINE

## 2021-01-21 PROCEDURE — 80053 COMPREHEN METABOLIC PANEL: CPT | Performed by: INTERNAL MEDICINE

## 2021-01-21 PROCEDURE — G8427 DOCREV CUR MEDS BY ELIG CLIN: HCPCS | Performed by: INTERNAL MEDICINE

## 2021-01-21 PROCEDURE — G8752 SYS BP LESS 140: HCPCS | Performed by: INTERNAL MEDICINE

## 2021-01-21 PROCEDURE — 84439 ASSAY OF FREE THYROXINE: CPT | Performed by: INTERNAL MEDICINE

## 2021-01-21 PROCEDURE — G0402 INITIAL PREVENTIVE EXAM: HCPCS | Performed by: INTERNAL MEDICINE

## 2021-01-21 PROCEDURE — 3017F COLORECTAL CA SCREEN DOC REV: CPT | Performed by: INTERNAL MEDICINE

## 2021-01-21 PROCEDURE — G8536 NO DOC ELDER MAL SCRN: HCPCS | Performed by: INTERNAL MEDICINE

## 2021-01-21 PROCEDURE — 99214 OFFICE O/P EST MOD 30 MIN: CPT | Performed by: INTERNAL MEDICINE

## 2021-01-21 PROCEDURE — 85025 COMPLETE CBC W/AUTO DIFF WBC: CPT | Performed by: INTERNAL MEDICINE

## 2021-01-21 PROCEDURE — 82550 ASSAY OF CK (CPK): CPT | Performed by: INTERNAL MEDICINE

## 2021-01-21 PROCEDURE — 84443 ASSAY THYROID STIM HORMONE: CPT | Performed by: INTERNAL MEDICINE

## 2021-01-21 PROCEDURE — G8754 DIAS BP LESS 90: HCPCS | Performed by: INTERNAL MEDICINE

## 2021-01-21 RX ORDER — SIMVASTATIN 80 MG/1
TABLET, FILM COATED ORAL
Qty: 90 TAB | Refills: 3 | Status: SHIPPED | OUTPATIENT
Start: 2021-01-21 | End: 2021-01-22 | Stop reason: SDUPTHER

## 2021-01-21 RX ORDER — CILOSTAZOL 100 MG/1
100 TABLET ORAL
Qty: 180 TAB | Status: SHIPPED | OUTPATIENT
Start: 2021-01-21 | End: 2022-01-05

## 2021-01-21 RX ORDER — NIACIN 500 MG/1
TABLET, EXTENDED RELEASE ORAL
Qty: 90 TAB | Refills: 3 | Status: SHIPPED | OUTPATIENT
Start: 2021-01-21 | End: 2022-01-05

## 2021-01-21 NOTE — PROGRESS NOTES
PROGRESS NOTES    This is a \"Welcome to [employer]" Initial Preventive Physical Examination (IPPE)    I have reviewed the patient's medical history in detail and updated the computerized patient record. He presents today for his initial welcome to Medicare screening examination visit. He is also in follow-up of his multiple medical problems include hypertension, glucose intolerance, hyperlipidemia, DJD, ASVD, ED and other multiple medical problems including former smoking history. He currently denies any chest pain, shortness of breath, palpitations, PND, orthopnea or other cardiac or respiratory complaints. He notes no current GI or  complaints. He notes no headaches, dizziness or neurologic complaints. No current arthritic complaints and he has no other complaints on complete review systems. Depression risk factor summary:      Patient Health Questionnaire (PHQ-9)   Over the last 2 weeks, how often have you been bothered by any of the following problems? · Little interest or pleasure in doing things? · Not at all. [0]  · Feeling down, depressed, or hopeless? · Not at all. [0]  · Trouble falling or staying asleep, or sleeping too much? · Not at all. [0]  · Feeling tired or having little energy? · Not at all. [0]  · Poor appetite or overeating? · Not at all. [0]  · Feeling bad about yourself - or that you are a failure or have let yourself or your family down? · Not at all. [0]  · Trouble concentrating on things, such as reading the newspaper or watching television? · Not at all. [0]  · Moving or speaking so slowly that other people could have noticed? Or the opposite - being so fidgety or restless that you have been moving around a lot more than usual?  · Not at all. [0]  · Thoughts that you would be better off dead, or of hurting yourself in some way? · Not at all. [0]    Total Score: 0/27  Depression Severity:   0-4 None, 5-9 mild, 10-14 moderate, 15-19 moderately severe, 20-27 severe. Functional Ability and Level of Safety:    Hearing Loss    Hearing is good. Activities of Daily Living   Self-care. Requires assistance with: no ADLs    Fall Risk   No fall risk factors    Abuse Screen   None    Adult Nutrition Screen   No risk factors noted. Review of Systems   ROS:    Constitutional: He denies fevers, weight loss, sweats. Eyes: No blurred or double vision. ENT: No difficulty with swallowing, taste, speech or smell. Neck: no stiffness or swelling  Respiratory: No cough wheezing or shortness of breath. Cardiovascular: Denies chest pain, palpitations, unexplained indigestion or syncope. Gastrointestinal:  No changes in bowel movements, no abdominal pain, no bloating. Genitourinary:  He denies frequency, nocturia or stranguria. Extremities: No joint pain, stiffness or swelling. Neurological:  No numbness, tingling, burring paresthesias or loss of motor strength. No syncope, dizziness or frequent headache  Lymphatic: no adenopathy noted  Hematologic: no easy bruising or bleeding gums  Skin:  No recent rashes or mole changes. Psychiatric/Behavioral:  Negative for depression. Physical Exam     Visit Vitals  /76   Pulse 67   Temp 98.4 °F (36.9 °C) (Temporal)   Resp 16   Ht 5' 8\" (1.727 m)   Wt 178 lb (80.7 kg)   SpO2 98%   BMI 27.06 kg/m²      Body mass index is 27.06 kg/m².   Visual Acuity:    Visual Acuity Screening    Right eye Left eye Both eyes   Without correction: 20/30 20/30 20/30   With correction:          Vitals:    01/21/21 1046   BP: 126/76   Pulse: 67   Resp: 16   Temp: 98.4 °F (36.9 °C)   TempSrc: Temporal   SpO2: 98%   Weight: 178 lb (80.7 kg)   Height: 5' 8\" (1.727 m)   PainSc:   0 - No pain        PHYSICAL EXAM:    General appearance - alert, well appearing, and in no distress  Mental status - alert, oriented to person, place, and time  HEENT:  Ears - bilateral TM's and external ear canals clear  Eyes - pupillary responses were normal.  Extraocular muscle function intact. Lids and conjunctiva not injected. Fundoscopic exam revealed sharp disc margins. eye movements intact  Pharynx- clear with teeth in good repair. No masses were noted  Neck - supple without thyromegaly or burit. No JVD noted  Lungs - clear to auscultation and percussion  Cardiac- normal rate, regular rhythm without murmurs. PMI not displaced. No gallop, rub or click  Abdomen - flat, soft, non-tender without palpable organomegaly or mass. No pulsatile mass was felt, and not bruit was heard. Bowel sounds were active  : Circumcised, Testes descended w/o masses  Rectal: normal sphincter tone, prostate 1+ enlarged, smooth and nontender without nodules, no masses, stool brown and hemacult negative  Extremities -  no clubbing cyanosis or edema  Lymphatics - no palpable lymphadenopathy, no hepatosplenomegaly  Hematologic: no petechiae or purpura  Peripheral vascular -Femoral, Dorsalis pedis and posterior tibial pulses felt without difficulty  Skin - no rash or unusual mole change noted  Neurological - Cranial nerves II-XII grossly intact. Motor strength 5/5. DTR's 2+ and symmetric. Station and gait normal  Back exam - full range of motion, no tenderness, palpable spasm or pain on motion  Musculoskeletal - no joint tenderness, deformity or swelling    EKG: normal EKG, normal sinus rhythm with mild decreased voltage, unchanged from previous tracings. Assessment/Plan:   Education and counseling provided:  Are appropriate based on today's review and evaluation  ASSESSMENT:   1. Essential hypertension    2. Glucose intolerance (impaired glucose tolerance)    3. Mixed hyperlipidemia    4. Primary osteoarthritis involving multiple joints    5. ASVD (arteriosclerotic vascular disease)    6. Prostate carcinoma ([facility] [address]    7. Alcohol screening    8. Welcome to Medicare preventive visit      Impression  1.   Hypertension that is controlled so continue current therapy reviewed with him  2.  Glucose intolerance repeat status pending a prior lab reviewed now make adjustments if necessary. 3.  Hyperlipidemia prior lab reviewed and repeat status pending I will adjust if needed. 4. DJD that is stable  5. ASVD that seems clinically stable continue aspirin and Pletal daily medicine renewed  6. Prostate carcinoma with PSA pending clinically without evidence of recurrence  7. Annual alcohol screening is done and at this point he says he drinks maybe 1 or 2 beers per week. I did caution regarding more than 2/day in males with increased cardiovascular risk and increased risk of liver disease and other GI effects. Welcome to Medicare initial annual examination and screening questionnaires completed today. The results were reviewed with him and his questions were answered. Lifestyle recommendations modifications discussed and made. I will call with lab results and make further recommendations or adjustments if necessary. Follow-up scheduled again for 6 months or sooner should there be a problem. PLAN:  .  Orders Placed This Encounter    CBC WITH AUTOMATED DIFF ([facility])    METABOLIC PANEL, COMPREHENSIVE ([facility])    LIPID PANEL ([facility])    CK ([facility])    HEMOGLOBIN A1C W/O EAG ([facility])    T4, FREE ([facility])    TSH 3RD GENERATION ([facility])    PROSTATE SPECIFIC AG ([facility])    URINALYSIS W/O MICRO ([facility])         ATTENTION:   This medical record was transcribed using an electronic medical records system. Although proofread, it may and can contain electronic and spelling errors. Other human spelling and other errors may be present. Corrections may be executed at a later time. Please feel free to contact us for any clarifications as needed. Follow-up and Dispositions    · Return in about 6 months (around 7/21/2021).            Panfilo Castillo MD.    The patient verbalized an understanding of the problems and plans as explained.

## 2021-01-21 NOTE — PATIENT INSTRUCTIONS
Learning About Diuretics for High Blood Pressure Overview Diuretics help to lower blood pressure. This reduces your risk of a heart attack and stroke. It also reduces your risk of kidney disease. Diuretics cause your kidneys to remove sodium and water. They also relax the blood vessel walls. These help lower your blood pressure. Examples · Chlorthalidone · Hydrochlorothiazide Possible side effects There are some common side effects. They are: · Too little potassium. · Feeling dizzy. · Rash. · Urinating a lot. · High blood sugar. (But this is not common.) You may have other side effects. Check the information that comes with your medicine. What to know about taking this medicine · You may take other medicines for blood pressure. Diuretics can help those work better. They can also prevent extra fluid in your body. · You may need to take potassium pills. Ask your doctor about this. · You may need blood tests to check on your health. For example, you may have tests to check your kidneys and your potassium level. · Take your medicines exactly as prescribed. Call your doctor if you think you are having a problem with your medicine. · Check with your doctor or pharmacist before you use any other medicines. This includes over-the-counter medicines. Make sure your doctor knows all of the medicines, vitamins, herbal products, and supplements you take. Taking some medicines together can cause problems. Where can you learn more? Go to http://www.gray.com/ Enter J485 in the search box to learn more about \"Learning About Diuretics for High Blood Pressure. \" Current as of: December 16, 2019               Content Version: 12.6 © 8970-0652 [employer]. Care instructions adapted under license by [employer] (which disclaims liability or warranty for this information). If you have questions about a medical condition or this instruction, always ask your healthcare professional. [employer] disclaims any warranty or liability for your use of this information.

## 2021-01-21 NOTE — PROGRESS NOTES
Chief Complaint   Patient presents with    Welcome To Medicare         1. Have you been to the ER, urgent care clinic since your last visit? Hospitalized since your last visit? no    2. Have you seen or consulted any other health care providers outside of the [address] [place] [address] since your last visit? Include any pap smears or colon screening.  no

## 2021-01-22 ENCOUNTER — APPOINTMENT (OUTPATIENT)
Dept: INTERNAL MEDICINE CLINIC | Age: 71
End: 2021-01-22

## 2021-01-22 DIAGNOSIS — E78.2 MIXED HYPERLIPIDEMIA: ICD-10-CM

## 2021-01-22 LAB
A-G RATIO,AGRAT: 1.4 RATIO
ALBUMIN SERPL-MCNC: 4.5 G/DL (ref 3.9–5.4)
ALP SERPL-CCNC: 74 U/L (ref 38–126)
ALT SERPL-CCNC: 80 U/L (ref 0–50)
ANION GAP SERPL CALC-SCNC: 15 MMOL/L
AST SERPL W P-5'-P-CCNC: 63 U/L (ref 14–36)
BILIRUB SERPL-MCNC: 0.7 MG/DL (ref 0.2–1.3)
BILIRUB UR QL: NEGATIVE
BUN SERPL-MCNC: 16 MG/DL (ref 9–20)
BUN/CREATININE RATIO,BUCR: 20 RATIO
CALCIUM SERPL-MCNC: 9.4 MG/DL (ref 8.4–10.2)
CHLORIDE SERPL-SCNC: 102 MMOL/L (ref 98–107)
CHOL/HDL RATIO,CHHD: 3 RATIO (ref 0–4)
CHOLEST SERPL-MCNC: 151 MG/DL (ref 0–200)
CK SERPL-CCNC: 490 U/L (ref 30–135)
CLARITY: CLEAR
CO2 SERPL-SCNC: 27 MMOL/L (ref 22–32)
COLOR UR: NORMAL
CREAT SERPL-MCNC: 0.8 MG/DL (ref 0.8–1.5)
ERYTHROCYTE [DISTWIDTH] IN BLOOD BY AUTOMATED COUNT: 13.8 %
GLOBULIN,GLOB: 3.2
GLUCOSE 24H UR-MRATE: NEGATIVE G/(24.H)
GLUCOSE SERPL-MCNC: 117 MG/DL (ref 75–110)
HBA1C MFR BLD HPLC: 5.9 % (ref 4–5.7)
HCT VFR BLD AUTO: 46.2 % (ref 37–51)
HDLC SERPL-MCNC: 51 MG/DL (ref 35–130)
HGB BLD-MCNC: 14.9 G/DL (ref 12–18)
HGB UR QL STRIP: NEGATIVE
KETONES UR QL STRIP.AUTO: NEGATIVE
LDL/HDL RATIO,LDHD: 1 RATIO
LDLC SERPL CALC-MCNC: 76 MG/DL (ref 0–130)
LEUKOCYTE ESTERASE: NEGATIVE
LYMPHOCYTES ABSOLUTE: 1.8 K/UL (ref 0.6–4.1)
LYMPHOCYTES NFR BLD: 21.3 % (ref 10–58.5)
MCH RBC QN AUTO: 30.4 PG (ref 26–32)
MCHC RBC AUTO-ENTMCNC: 32.3 G/DL (ref 30–36)
MCV RBC AUTO: 94.2 FL (ref 80–97)
MONOCYTES ABS-DIF,2141: 0.6 K/UL (ref 0–1.8)
MONOCYTES NFR BLD: 6.8 % (ref 0.1–24)
NEUTROPHILS # BLD: 71.9 % (ref 37–92)
NEUTROPHILS ABS,2156: 6.2 K/UL (ref 2–7.8)
NITRITE UR QL STRIP.AUTO: NEGATIVE
PH UR STRIP: 6 [PH] (ref 5–7)
PLATELET # BLD AUTO: 268 K/UL (ref 140–440)
POTASSIUM SERPL-SCNC: 4.7 MMOL/L (ref 3.6–5)
PROT SERPL-MCNC: 7.7 G/DL (ref 6.3–8.2)
PROT UR STRIP-MCNC: NEGATIVE MG/DL
PSA, TEST22: 0 NG/ML (ref 0–4)
RBC # BLD AUTO: 4.9 M/UL (ref 4.2–6.3)
SODIUM SERPL-SCNC: 144 MMOL/L (ref 137–145)
SP GR UR REFRACTOMETRY: 1.02 (ref 1–1.03)
T4 FREE SERPL-MCNC: 0.94 NG/DL (ref 0.58–2.3)
TRIGL SERPL-MCNC: 122 MG/DL (ref 0–200)
TSH SERPL DL<=0.05 MIU/L-ACNC: 2.78 UIU/ML (ref 0.34–5.6)
UROBILINOGEN UR QL STRIP.AUTO: NEGATIVE
VLDLC SERPL CALC-MCNC: 24 MG/DL
WBC # BLD AUTO: 8.6 K/UL (ref 4.1–10.9)

## 2021-01-22 RX ORDER — SIMVASTATIN 80 MG/1
TABLET, FILM COATED ORAL
Qty: 90 TAB | Refills: 3 | Status: SHIPPED | OUTPATIENT
Start: 2021-01-22 | End: 2022-01-05

## 2021-01-22 NOTE — TELEPHONE ENCOUNTER
RX refill request from the patient/pharmacy. Patient last seen 01- with labs, and next appt. scheduled for 07-  Requested Prescriptions     Pending Prescriptions Disp Refills    simvastatin (Zocor) 80 mg tablet 90 Tab 3     Sig: TAKE 1 TABLET DAILY   .

## 2021-01-22 NOTE — PROGRESS NOTES
Visual Acuity Screening    Right eye Left eye Both eyes   Without correction: 20/30 20/30 20/30   With correction:

## 2021-02-20 PROBLEM — Z00.00 WELCOME TO MEDICARE PREVENTIVE VISIT: Status: RESOLVED | Noted: 2021-01-21 | Resolved: 2021-02-20

## 2021-07-20 NOTE — PROGRESS NOTES
Chief Complaint   Patient presents with    Hypertension     6 month f/up    Cholesterol Problem    Blood sugar problem    Nicotine Dependence       SUBJECTIVE:    Eil Sue is a 73 y.o. male who returns in follow-up of his medical problems include hypertension, hyperlipidemia, glucose intolerance, DJD, ED and other medical problems including peripheral vascular disease. He is doing quite well and taking his medications and trying to follow his diet he is now 9 years status post last cigarette smoking. He currently denies any chest pain, shortness of breath, palpitations, PND, orthopnea or other cardiac or respiratory complaints. He notes no GI or  complaints. He notes no headaches, dizziness or neurologic complaints. He has no current active arthritic complaints and and no other complaints on complete review of systems. Current Outpatient Medications   Medication Sig Dispense Refill    aspirin (ASPIRIN) 325 mg tablet Take 325 mg by mouth daily.  simvastatin (Zocor) 80 mg tablet TAKE 1 TABLET DAILY 90 Tab 3    cilostazoL (PLETAL) 100 mg tablet Take 1 Tab by mouth Before breakfast and dinner.  180 Tab prn    niacin ER (Niaspan) 500 mg tablet TAKE 1 TABLET AT BEDTIME 90 Tab 3     Past Medical History:   Diagnosis Date    Arthritis of multiple sites 12/21/2017    ASVD (arteriosclerotic vascular disease) 12/21/2017    Cancer ([facility] [address]     prostate    DJD (degenerative joint disease) 12/21/2017    ED (erectile dysfunction) 12/21/2017    Glucose intolerance (impaired glucose tolerance) 12/21/2017    History of tobacco abuse 12/21/2017    HTN (hypertension) 12/21/2017    Hypercholesterolemia     Hyperlipidemia 12/21/2017    On long term drug therapy 12/21/2017    PAD (peripheral artery disease) (HCC)     left leg    Prostate carcinoma ([facility] [address] 12/21/2017     Past Surgical History:   Procedure Laterality Date    HX GI      HX HEMORRHOIDECTOMY      VASCULAR SURGERY PROCEDURE UNLIST left leg stent     Allergies   Allergen Reactions    Bee Sting [Sting, Bee] Swelling     Swelling (local area of sting) - per COLT Wolfe in PAT    Penicillins Unknown (comments)       REVIEW OF SYSTEMS:  General: negative for - chills or fever, or weight loss or gain  ENT: negative for - headaches, nasal congestion or tinnitus  Eyes: no blurred or visual changes  Neck: No stiffness or swollen nodes  Respiratory: negative for - cough, hemoptysis, shortness of breath or wheezing  Cardiovascular : negative for - chest pain, edema, palpitations or shortness of breath  Gastrointestinal: negative for - abdominal pain, blood in stools, heartburn or nausea/vomiting  Genito-Urinary: no dysuria, trouble voiding, or hematuria  Musculoskeletal: negative for - gait disturbance, joint pain, joint stiffness or joint swelling  Neurological: no TIA or stroke symptoms  Hematologic: no bruises, no bleeding  Lymphatic: no swollen glands  Integument: no lumps, mole changes, nail changes or rash  Endocrine:no malaise/lethargy poly uria or polydipsia or unexpected weight changes        Social History     Socioeconomic History    Marital status:      Spouse name: Not on file    Number of children: Not on file    Years of education: Not on file    Highest education level: Not on file   Tobacco Use    Smoking status: Former Smoker     Packs/day: 1.00     Years: 45.00     Pack years: 45.00     Quit date:      Years since quittin.5    Smokeless tobacco: Never Used   Substance and Sexual Activity    Alcohol use:  Yes     Alcohol/week: 5.0 standard drinks     Types: 6 Cans of beer per week    Drug use: No    Sexual activity: Yes     Partners: Female     Birth control/protection: Surgical     Social Determinants of Health     Financial Resource Strain:     Difficulty of Paying Living Expenses:    Food Insecurity:     Worried About Running Out of Food in the Last Year:     [address] in the Last Year: Transportation Needs:     Lack of Transportation (Medical):  Lack of Transportation (Non-Medical):    Physical Activity:     Days of Exercise per Week:     Minutes of Exercise per Session:    Stress:     Feeling of Stress :    Social Connections:     Frequency of Communication with Friends and Family:     Frequency of Social Gatherings with Friends and Family:     Attends [employer] Services:     Active Member of Clubs or Organizations:     Attends Club or Organization Meetings:     Marital Status:      Family History   Problem Relation Age of Onset    Diabetes Mother     Alcohol abuse Father        OBJECTIVE:     Visit Vitals  /76 (BP 1 Location: Left upper arm, BP Patient Position: Sitting)   Pulse 70   Temp 98.4 °F (36.9 °C)   Resp 16   Ht 5' 8\" (1.727 m)   Wt 179 lb (81.2 kg)   SpO2 95%   BMI 27.22 kg/m²     CONSTITUTIONAL:   well nourished, appears age appropriate  EYES: sclera anicteric, PERRL, EOMI  ENMT:nares clear, moist mucous membranes, pharynx clear  NECK: supple. Thyroid normal, No JVD or bruits  RESPIRATORY: Chest: clear to ascultation and percussion, normal inspiratory effort  CARDIOVASCULAR: Heart: regular rate and rhythm no murmurs, rubs or gallops, PMI not displaced, No thrills, no peripheral edema  GASTROINTESTINAL: Abdomen: non distended, soft, non tender, bowel sounds normal  HEMATOLOGIC: no purpura, petechiae or bruising  LYMPHATIC: No lymph node enlargemant  MUSCULOSKELETAL: Extremities: no active synovitis, pulse 1+   INTEGUMENT: No unusual rashes or suspicious skin lesions noted. Nails appear normal.  PERIPHERAL VASCULAR: normal pulses femoral, PT and DP  NEUROLOGIC: non-focal exam, A & O X 3  PSYCHIATRIC:, appropriate affect     ASSESSMENT:   1. Essential hypertension    2. Glucose intolerance (impaired glucose tolerance)    3. Mixed hyperlipidemia    4. Primary osteoarthritis involving multiple joints    5. History of tobacco abuse      Impression  1.   Hypertension that is controlled so continue current medicines reviewed with him but on diet only for hypertension now  2. Glucose intolerance repeat status pending  3. Hyperlipidemia I renewed his medicines and he will continue same  4. DJD that is stable   5. History of tobacco abuse now 9 years without smoking  I will recheck him again myself in 6 months or sooner if there is a problem. I will call with today's lab results. PLAN:  .  Orders Placed This Encounter    METABOLIC PANEL, COMPREHENSIVE    LIPID PANEL    CK    HEMOGLOBIN A1C WITH EAG    aspirin (ASPIRIN) 325 mg tablet         ATTENTION:   This medical record was transcribed using an electronic medical records system. Although proofread, it may and can contain electronic and spelling errors. Other human spelling and other errors may be present. Corrections may be executed at a later time. Please feel free to contact us for any clarifications as needed. Follow-up and Dispositions    · Return in about 6 months (around 1/21/2022). No results found for any visits on 07/21/21. Brenna Grey MD    The patient verbalized understanding of the problems and plans as explained.

## 2021-07-21 ENCOUNTER — OFFICE VISIT (OUTPATIENT)
Dept: INTERNAL MEDICINE CLINIC | Age: 71
End: 2021-07-21
Payer: MEDICARE

## 2021-07-21 VITALS
WEIGHT: 179 LBS | DIASTOLIC BLOOD PRESSURE: 76 MMHG | SYSTOLIC BLOOD PRESSURE: 118 MMHG | OXYGEN SATURATION: 95 % | HEART RATE: 70 BPM | TEMPERATURE: 98.4 F | BODY MASS INDEX: 27.13 KG/M2 | RESPIRATION RATE: 16 BRPM | HEIGHT: 68 IN

## 2021-07-21 DIAGNOSIS — E78.2 MIXED HYPERLIPIDEMIA: ICD-10-CM

## 2021-07-21 DIAGNOSIS — I10 ESSENTIAL HYPERTENSION: Primary | ICD-10-CM

## 2021-07-21 DIAGNOSIS — Z87.891 HISTORY OF TOBACCO ABUSE: ICD-10-CM

## 2021-07-21 DIAGNOSIS — M15.9 PRIMARY OSTEOARTHRITIS INVOLVING MULTIPLE JOINTS: ICD-10-CM

## 2021-07-21 DIAGNOSIS — R73.02 GLUCOSE INTOLERANCE (IMPAIRED GLUCOSE TOLERANCE): ICD-10-CM

## 2021-07-21 LAB
ALBUMIN SERPL-MCNC: 4 G/DL (ref 3.5–5)
ALBUMIN/GLOB SERPL: 1.1 {RATIO} (ref 1.1–2.2)
ALP SERPL-CCNC: 89 U/L (ref 45–117)
ALT SERPL-CCNC: 72 U/L (ref 12–78)
ANION GAP SERPL CALC-SCNC: 7 MMOL/L (ref 5–15)
AST SERPL-CCNC: 34 U/L (ref 15–37)
BILIRUB SERPL-MCNC: 0.6 MG/DL (ref 0.2–1)
BUN SERPL-MCNC: 15 MG/DL (ref 6–20)
BUN/CREAT SERPL: 18 (ref 12–20)
CALCIUM SERPL-MCNC: 9.2 MG/DL (ref 8.5–10.1)
CHLORIDE SERPL-SCNC: 106 MMOL/L (ref 97–108)
CHOLEST SERPL-MCNC: 175 MG/DL
CK SERPL-CCNC: 153 U/L (ref 39–308)
CO2 SERPL-SCNC: 27 MMOL/L (ref 21–32)
CREAT SERPL-MCNC: 0.85 MG/DL (ref 0.7–1.3)
EST. AVERAGE GLUCOSE BLD GHB EST-MCNC: 126 MG/DL
GLOBULIN SER CALC-MCNC: 3.8 G/DL (ref 2–4)
GLUCOSE SERPL-MCNC: 114 MG/DL (ref 65–100)
HBA1C MFR BLD: 6 % (ref 4–5.6)
HDLC SERPL-MCNC: 48 MG/DL
HDLC SERPL: 3.6 {RATIO} (ref 0–5)
LDLC SERPL CALC-MCNC: 111.8 MG/DL (ref 0–100)
POTASSIUM SERPL-SCNC: 4.6 MMOL/L (ref 3.5–5.1)
PROT SERPL-MCNC: 7.8 G/DL (ref 6.4–8.2)
SODIUM SERPL-SCNC: 140 MMOL/L (ref 136–145)
TRIGL SERPL-MCNC: 76 MG/DL (ref ?–150)
VLDLC SERPL CALC-MCNC: 15.2 MG/DL

## 2021-07-21 PROCEDURE — G8752 SYS BP LESS 140: HCPCS | Performed by: INTERNAL MEDICINE

## 2021-07-21 PROCEDURE — 3017F COLORECTAL CA SCREEN DOC REV: CPT | Performed by: INTERNAL MEDICINE

## 2021-07-21 PROCEDURE — 99214 OFFICE O/P EST MOD 30 MIN: CPT | Performed by: INTERNAL MEDICINE

## 2021-07-21 PROCEDURE — 1101F PT FALLS ASSESS-DOCD LE1/YR: CPT | Performed by: INTERNAL MEDICINE

## 2021-07-21 PROCEDURE — G8510 SCR DEP NEG, NO PLAN REQD: HCPCS | Performed by: INTERNAL MEDICINE

## 2021-07-21 PROCEDURE — G8754 DIAS BP LESS 90: HCPCS | Performed by: INTERNAL MEDICINE

## 2021-07-21 PROCEDURE — G8536 NO DOC ELDER MAL SCRN: HCPCS | Performed by: INTERNAL MEDICINE

## 2021-07-21 PROCEDURE — G8419 CALC BMI OUT NRM PARAM NOF/U: HCPCS | Performed by: INTERNAL MEDICINE

## 2021-07-21 PROCEDURE — G8427 DOCREV CUR MEDS BY ELIG CLIN: HCPCS | Performed by: INTERNAL MEDICINE

## 2021-07-21 RX ORDER — ASPIRIN 325 MG
325 TABLET ORAL DAILY
COMMUNITY
End: 2022-07-25

## 2021-07-21 NOTE — PATIENT INSTRUCTIONS
DASH Diet: Care Instructions  Your Care Instructions     The DASH diet is an eating plan that can help lower your blood pressure. DASH stands for Dietary Approaches to Stop Hypertension. Hypertension is high blood pressure. The DASH diet focuses on eating foods that are high in calcium, potassium, and magnesium. These nutrients can lower blood pressure. The foods that are highest in these nutrients are fruits, vegetables, low-fat dairy products, nuts, seeds, and legumes. But taking calcium, potassium, and magnesium supplements instead of eating foods that are high in those nutrients does not have the same effect. The DASH diet also includes whole grains, fish, and poultry. The DASH diet is one of several lifestyle changes your doctor may recommend to lower your high blood pressure. Your doctor may also want you to decrease the amount of sodium in your diet. Lowering sodium while following the DASH diet can lower blood pressure even further than just the DASH diet alone. Follow-up care is a key part of your treatment and safety. Be sure to make and go to all appointments, and call your doctor if you are having problems. It's also a good idea to know your test results and keep a list of the medicines you take. How can you care for yourself at home? Following the DASH diet  · Eat 4 to 5 servings of fruit each day. A serving is 1 medium-sized piece of fruit, ½ cup chopped or canned fruit, 1/4 cup dried fruit, or 4 ounces (½ cup) of fruit juice. Choose fruit more often than fruit juice. · Eat 4 to 5 servings of vegetables each day. A serving is 1 cup of lettuce or raw leafy vegetables, ½ cup of chopped or cooked vegetables, or 4 ounces (½ cup) of vegetable juice. Choose vegetables more often than vegetable juice. · Get 2 to 3 servings of low-fat and fat-free dairy each day. A serving is 8 ounces of milk, 1 cup of yogurt, or 1 ½ ounces of cheese. · Eat 6 to 8 servings of grains each day.  A serving is 1 slice of bread, 1 ounce of dry cereal, or ½ cup of cooked rice, pasta, or cooked cereal. Try to choose whole-grain products as much as possible. · Limit lean meat, poultry, and fish to 2 servings each day. A serving is 3 ounces, about the size of a deck of cards. · Eat 4 to 5 servings of nuts, seeds, and legumes (cooked dried beans, lentils, and split peas) each week. A serving is 1/3 cup of nuts, 2 tablespoons of seeds, or ½ cup of cooked beans or peas. · Limit fats and oils to 2 to 3 servings each day. A serving is 1 teaspoon of vegetable oil or 2 tablespoons of salad dressing. · Limit sweets and added sugars to 5 servings or less a week. A serving is 1 tablespoon jelly or jam, ½ cup sorbet, or 1 cup of lemonade. · Eat less than 2,300 milligrams (mg) of sodium a day. If you limit your sodium to 1,500 mg a day, you can lower your blood pressure even more. · Be aware that all of these are the suggested number of servings for people who eat 1,800 to 2,000 calories a day. Your recommended number of servings may be different if you need more or fewer calories. Tips for success  · Start small. Do not try to make dramatic changes to your diet all at once. You might feel that you are missing out on your favorite foods and then be more likely to not follow the plan. Make small changes, and stick with them. Once those changes become habit, add a few more changes. · Try some of the following:  ? Make it a goal to eat a fruit or vegetable at every meal and at snacks. This will make it easy to get the recommended amount of fruits and vegetables each day. ? Try yogurt topped with fruit and nuts for a snack or healthy dessert. ? Add lettuce, tomato, cucumber, and onion to sandwiches. ? Combine a ready-made pizza crust with low-fat mozzarella cheese and lots of vegetable toppings. Try using tomatoes, squash, spinach, broccoli, carrots, cauliflower, and onions. ?  Have a variety of cut-up vegetables with a low-fat dip as an appetizer instead of chips and dip. ? Sprinkle sunflower seeds or chopped almonds over salads. Or try adding chopped walnuts or almonds to cooked vegetables. ? Try some vegetarian meals using beans and peas. Add garbanzo or kidney beans to salads. Make burritos and tacos with mashed borrego beans or black beans. Where can you learn more? Go to http://www.curiel.com/  Enter H967 in the search box to learn more about \"DASH Diet: Care Instructions. \"  Current as of: August 31, 2020               Content Version: 12.8  © 9266-0488 [employer]. Care instructions adapted under license by [employer] (which disclaims liability or warranty for this information). If you have questions about a medical condition or this instruction, always ask your healthcare professional. [address] any warranty or liability for your use of this information.

## 2021-07-21 NOTE — PROGRESS NOTES
Chief Complaint   Patient presents with    Hypertension     6 month f/up    Cholesterol Problem    Blood sugar problem    Nicotine Dependence     1. Have you been to the ER, urgent care clinic since your last visit? Hospitalized since your last visit? No    2. Have you seen or consulted any other health care providers outside of the [address] [place] [address] since your last visit? Include any pap smears or colon screening.  Went to [facility] regarding fall he had on left shoulder

## 2022-01-05 DIAGNOSIS — E78.2 MIXED HYPERLIPIDEMIA: ICD-10-CM

## 2022-01-05 RX ORDER — CILOSTAZOL 100 MG/1
TABLET ORAL
Qty: 180 TABLET | Refills: 3 | Status: SHIPPED | OUTPATIENT
Start: 2022-01-05 | End: 2022-01-26 | Stop reason: SDUPTHER

## 2022-01-05 RX ORDER — SIMVASTATIN 80 MG/1
TABLET, FILM COATED ORAL
Qty: 90 TABLET | Refills: 3 | Status: SHIPPED | OUTPATIENT
Start: 2022-01-05 | End: 2022-01-26 | Stop reason: SDUPTHER

## 2022-01-05 RX ORDER — NIACIN 500 MG/1
TABLET, EXTENDED RELEASE ORAL
Qty: 90 TABLET | Refills: 3 | Status: SHIPPED | OUTPATIENT
Start: 2022-01-05 | End: 2022-01-26 | Stop reason: SDUPTHER

## 2022-01-05 NOTE — TELEPHONE ENCOUNTER
RX refill request from the patient/pharmacy. Patient last seen 07- with labs, and next appt. scheduled for 01-  Requested Prescriptions     Pending Prescriptions Disp Refills    niacin ER (NIASPAN) 500 mg tablet [Pharmacy Med Name: NIACIN ER TAB 500MG] 90 Tablet 3     Sig: TAKE 1 TABLET AT BEDTIME    cilostazoL (PLETAL) 100 mg tablet [Pharmacy Med Name: CILOSTAZOL TAB 100MG] 180 Tablet 3     Sig: TAKE 1 TABLET BEFORE       BREAKFAST AND DINNER    simvastatin (ZOCOR) 80 mg tablet [Pharmacy Med Name: SIMVASTATIN  TAB 80MG] 90 Tablet 3     Sig: TAKE 1 TABLET DAILY   .

## 2022-01-21 PROBLEM — Z00.00 MEDICARE ANNUAL WELLNESS VISIT, INITIAL: Status: ACTIVE | Noted: 2022-01-21

## 2022-01-21 PROBLEM — E55.9 VITAMIN D DEFICIENCY: Status: ACTIVE | Noted: 2022-01-21

## 2022-01-21 PROBLEM — Z00.00 MEDICARE ANNUAL WELLNESS VISIT, SUBSEQUENT: Status: ACTIVE | Noted: 2022-01-21

## 2022-01-21 PROBLEM — Z00.00 MEDICARE ANNUAL WELLNESS VISIT, SUBSEQUENT: Status: RESOLVED | Noted: 2022-01-21 | Resolved: 2022-01-21

## 2022-01-21 NOTE — PROGRESS NOTES
This is an Initial Medicare Annual Wellness Exam (AWV) (Performed 12 months after IPPE or effective date of Medicare Part B enrollment, Once in a lifetime)    I have reviewed the patient's medical history in detail and updated the computerized patient record. He presents today for his initial annual Medicare wellness examination and screening questionnaire. He is also in follow-up of his multiple medical problems include hypertension diet-controlled, hyperlipidemia, ASVD, glucose intolerance, prostate carcinoma status post prostatectomy 6 to 7 years ago, DJD, prior history of tobacco abuse and other multiple medical problems. He is taking his medications and trying to follow his diet and try to remain physically active. He currently denies any chest pain, shortness of breath, palpitations, PND, orthopnea or other cardiac or respiratory complaints. He denies any current GI or  complaints noting he has very little leakage and only really has a problem if he bends over unexpectedly. He denies any headaches, dizziness or neurologic complaints. He has no current active arthritic complaints and and no other complaints on complete review of systems. Assessment/Plan   Education and counseling provided:  Are appropriate based on today's review and evaluation    ASSESSMENT:   1. Essential hypertension    2. Glucose intolerance (impaired glucose tolerance)    3. Mixed hyperlipidemia    4. Primary osteoarthritis involving multiple joints    5. ASVD (arteriosclerotic vascular disease)    6. History of tobacco abuse    7. Prostate carcinoma ([facility] [address]    8. Alcohol screening    9. Medicare annual wellness visit, initial    10. Vitamin D deficiency      Impression  1. Hypertension that is controlled on recheck by me so continue with dietary management  2. Glucose intolerance repeat status is pending a prior lab reviewed not make adjustments if necessary.   3.  Hyperlipidemia prior lab reviewed and repeat status pending I will adjust if needed. 4. DJD that is stable  5. ASCVD clinically stable continue aspirin and Pletal daily  6. History of tobacco abuse no longer uses tobacco  7. Prostate carcinoma status post prostatectomy. PSA is pending. Prostatic fossa was empty on exam  8. Annual alcohol screening is done and he drinks maybe 3 beers per week. We spent 5 minutes discussing excessive alcohol use in males more than 2 drinks per day with increased cardiovascular risk and increased risk of liver disease and other GI effects. 9.  Vitamin D deficiency repeat status is pending  Medicare initial annual wellness examination screening questionnaires completed today. The results were reviewed with him and his questions were answered. Lifestyle recommendations modifications discussed and made. I will call the lab results and make further recommendations or adjustments if necessary. Follow-up in 6 months or sooner if there is a problem. PLAN:  .  Orders Placed This Encounter    CBC WITH AUTOMATED DIFF    METABOLIC PANEL, COMPREHENSIVE    LIPID PANEL    CK    HEMOGLOBIN A1C WITH EAG    T4 (THYROXINE)    TSH 3RD GENERATION    URINALYSIS W/ REFLEX CULTURE    PSA SCREENING (SCREENING)    VITAMIN D, 25 HYDROXY    vit A/vit C/vit E/zinc/copper (PRESERVISION AREDS PO)         ATTENTION:   This medical record was transcribed using an electronic medical records system. Although proofread, it may and can contain electronic and spelling errors. Other human spelling and other errors may be present. Corrections may be executed at a later time. Please feel free to contact us for any clarifications as needed. Follow-up and Dispositions    · Return in about 6 months (around 7/24/2022).            Gabriela Cam MD     Depression Risk Factor Screening     3 most recent PHQ Screens 1/24/2022   Little interest or pleasure in doing things Not at all   Feeling down, depressed, irritable, or hopeless Not at all   Total Score PHQ 2 0       Alcohol & Drug Abuse Risk Screen    Do you average more than 1 drink per night or more than 7 drinks a week: No    In the past three months have you have had more than 4 drinks containing alcohol on one occasion: No          Functional Ability and Level of Safety    Hearing: Hearing is good. Activities of Daily Living: The home contains: no safety equipment. Patient does total self care     Ambulation: with no difficulty      Fall Risk:  Fall Risk Assessment, last 12 mths 1/24/2022   Able to walk? Yes   Fall in past 12 months? 0   Do you feel unsteady? 0   Are you worried about falling 0      Abuse Screen:  Patient is not abused       Cognitive Screening    Has your family/caregiver stated any concerns about your memory: no     Cognitive Screening: Normal - MMSE (Mini Mental Status Exam)      ROS:    Constitutional: He denies fevers, weight loss, sweats. Eyes: No blurred or double vision. ENT: No difficulty with swallowing, taste, speech or smell. Neck: no stiffness or swelling  Respiratory: No cough wheezing or shortness of breath. Cardiovascular: Denies chest pain, palpitations, unexplained indigestion or syncope. Gastrointestinal:  No changes in bowel movements, no abdominal pain, no bloating. Genitourinary:  He denies frequency, nocturia or stranguria. Extremities: No joint pain, stiffness or swelling. Neurological:  No numbness, tingling, burring paresthesias or loss of motor strength. No syncope, dizziness or frequent headache  Lymphatic: no adenopathy noted  Hematologic: no easy bruising or bleeding gums  Skin:  No recent rashes or mole changes. Psychiatric/Behavioral:  Negative for depression.         Vitals:    01/24/22 0807 01/24/22 0834   BP: (!) 144/90 118/76   Pulse: 71    Resp: 16    Temp: 98 °F (36.7 °C)    TempSrc: Oral    SpO2: 96%    Weight: 182 lb (82.6 kg)    Height: 5' 8\" (1.727 m)    PainSc:   0 - No pain         PHYSICAL EXAM:    General appearance - alert, well appearing, and in no distress  Mental status - alert, oriented to person, place, and time  HEENT:  Ears - bilateral TM's and external ear canals clear  Eyes - pupillary responses were normal.  Extraocular muscle function intact. Lids and conjunctiva not injected. Fundoscopic exam revealed sharp disc margins. eye movements intact  Pharynx- clear with teeth in good repair. No masses were noted  Neck - supple without thyromegaly or burit. No JVD noted  Lungs - clear to auscultation and percussion  Cardiac- normal rate, regular rhythm without murmurs. PMI not displaced. No gallop, rub or click  Abdomen - flat, soft, non-tender without palpable organomegaly or mass. No pulsatile mass was felt, and not bruit was heard. Bowel sounds were active  : Circumcised, Testes descended w/o masses  Rectal: normal sphincter tone, prostate absent post prostatectomy without nodules, no masses, stool brown and hemacult negative  Extremities -  no clubbing cyanosis or edema  Lymphatics - no palpable lymphadenopathy, no hepatosplenomegaly  Hematologic: no petechiae or purpura  Peripheral vascular -Femoral, Dorsalis pedis and posterior tibial pulses felt without difficulty  Skin - no rash or unusual mole change noted  Neurological - Cranial nerves II-XII grossly intact. Motor strength 5/5. DTR's 2+ and symmetric.   Station and gait normal  Back exam - full range of motion, no tenderness, palpable spasm or pain on motion  Musculoskeletal - no joint tenderness, deformity or swelling          Health Maintenance Due     Health Maintenance Due   Topic Date Due    DTaP/Tdap/Td series (1 - Tdap) Never done    Low dose CT lung screening  Never done    AAA Screening 73-67 YO Male Smoking Patients  Never done       Patient Care Team   Patient Care Team:  Radha Manrique MD as PCP - General (Internal Medicine)  Radha Manrique MD as PCP - [facility] Empaneled Provider    History     Patient Active Problem List   Diagnosis Code    Mixed hyperlipidemia E78.2    ED (erectile dysfunction) N52.9    History of tobacco abuse Z87.891    Prostate carcinoma (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    Neoplasm of uncertain behavior of skin D48.5    Medicare annual wellness visit, initial Z00.00    Vitamin D deficiency E55.9     Past Medical History:   Diagnosis Date    Arthritis of multiple sites 12/21/2017    ASVD (arteriosclerotic vascular disease) 12/21/2017    [facility]     prostate    DJD (degenerative joint disease) 12/21/2017    ED (erectile dysfunction) 12/21/2017    Glucose intolerance (impaired glucose tolerance) 12/21/2017    History of tobacco abuse 12/21/2017    HTN (hypertension) 12/21/2017    Hypercholesterolemia     Hyperlipidemia 12/21/2017    On long term drug therapy 12/21/2017    PAD (peripheral artery disease) ([facility] [address]     left leg    [facility] 12/21/2017      Past Surgical History:   Procedure Laterality Date    HX GI      HX HEMORRHOIDECTOMY      VASCULAR SURGERY PROCEDURE UNLIST      left leg stent     Current Outpatient Medications   Medication Sig Dispense Refill    vit A/vit C/vit E/zinc/copper (PRESERVISION AREDS PO) Take 2 Tablets by mouth daily.  niacin ER (NIASPAN) 500 mg tablet TAKE 1 TABLET AT BEDTIME 90 Tablet 3    cilostazoL (PLETAL) 100 mg tablet TAKE 1 TABLET BEFORE       BREAKFAST AND DINNER 180 Tablet 3    simvastatin (ZOCOR) 80 mg tablet TAKE 1 TABLET DAILY 90 Tablet 3    aspirin (ASPIRIN) 325 mg tablet Take 325 mg by mouth daily.        Allergies   Allergen Reactions    Bee Sting [Sting, Bee] Swelling     Swelling (local area of sting) - per RN Carolyn Shaikh in PAT    Penicillins Unknown (comments)       Family History   Problem Relation Age of Onset    Diabetes Mother     Alcohol abuse Father      Social History     Tobacco Use    Smoking status: Former Smoker     Packs/day: 1.00     Years: 45.00     Pack years: 45.00     Quit date: 2012     Years since quitting: 10.0    Smokeless tobacco: Never Used   Substance Use Topics    Alcohol use:  Yes     Alcohol/week: 5.0 standard drinks     Types: 6 Cans of beer per week       John Aviles MD

## 2022-01-24 ENCOUNTER — OFFICE VISIT (OUTPATIENT)
Dept: INTERNAL MEDICINE CLINIC | Age: 72
End: 2022-01-24
Payer: MEDICARE

## 2022-01-24 VITALS
BODY MASS INDEX: 27.58 KG/M2 | HEART RATE: 71 BPM | TEMPERATURE: 98 F | OXYGEN SATURATION: 96 % | WEIGHT: 182 LBS | SYSTOLIC BLOOD PRESSURE: 118 MMHG | HEIGHT: 68 IN | RESPIRATION RATE: 16 BRPM | DIASTOLIC BLOOD PRESSURE: 76 MMHG

## 2022-01-24 DIAGNOSIS — M15.9 PRIMARY OSTEOARTHRITIS INVOLVING MULTIPLE JOINTS: ICD-10-CM

## 2022-01-24 DIAGNOSIS — C61 PROSTATE CARCINOMA (HCC): ICD-10-CM

## 2022-01-24 DIAGNOSIS — Z00.00 MEDICARE ANNUAL WELLNESS VISIT, INITIAL: ICD-10-CM

## 2022-01-24 DIAGNOSIS — Z87.891 HISTORY OF TOBACCO ABUSE: ICD-10-CM

## 2022-01-24 DIAGNOSIS — I10 ESSENTIAL HYPERTENSION: Primary | ICD-10-CM

## 2022-01-24 DIAGNOSIS — E55.9 VITAMIN D DEFICIENCY: ICD-10-CM

## 2022-01-24 DIAGNOSIS — I70.90 ASVD (ARTERIOSCLEROTIC VASCULAR DISEASE): ICD-10-CM

## 2022-01-24 DIAGNOSIS — E78.2 MIXED HYPERLIPIDEMIA: ICD-10-CM

## 2022-01-24 DIAGNOSIS — Z13.39 ALCOHOL SCREENING: ICD-10-CM

## 2022-01-24 DIAGNOSIS — R73.02 GLUCOSE INTOLERANCE (IMPAIRED GLUCOSE TOLERANCE): ICD-10-CM

## 2022-01-24 LAB
25(OH)D3 SERPL-MCNC: 24 NG/ML (ref 30–100)
ALBUMIN SERPL-MCNC: 4 G/DL (ref 3.5–5)
ALBUMIN/GLOB SERPL: 1.1 {RATIO} (ref 1.1–2.2)
ALP SERPL-CCNC: 105 U/L (ref 45–117)
ALT SERPL-CCNC: 76 U/L (ref 12–78)
ANION GAP SERPL CALC-SCNC: 7 MMOL/L (ref 5–15)
APPEARANCE UR: CLEAR
AST SERPL-CCNC: 36 U/L (ref 15–37)
BACTERIA URNS QL MICRO: NEGATIVE /HPF
BASOPHILS # BLD: 0 K/UL (ref 0–0.1)
BASOPHILS NFR BLD: 0 % (ref 0–1)
BILIRUB SERPL-MCNC: 0.5 MG/DL (ref 0.2–1)
BILIRUB UR QL: NEGATIVE
BUN SERPL-MCNC: 15 MG/DL (ref 6–20)
BUN/CREAT SERPL: 17 (ref 12–20)
CALCIUM SERPL-MCNC: 9.3 MG/DL (ref 8.5–10.1)
CHLORIDE SERPL-SCNC: 104 MMOL/L (ref 97–108)
CHOLEST SERPL-MCNC: 176 MG/DL
CK SERPL-CCNC: 128 U/L (ref 39–308)
CO2 SERPL-SCNC: 27 MMOL/L (ref 21–32)
COLOR UR: NORMAL
CREAT SERPL-MCNC: 0.89 MG/DL (ref 0.7–1.3)
DIFFERENTIAL METHOD BLD: NORMAL
EOSINOPHIL # BLD: 0.3 K/UL (ref 0–0.4)
EOSINOPHIL NFR BLD: 3 % (ref 0–7)
EPITH CASTS URNS QL MICRO: NORMAL /LPF
ERYTHROCYTE [DISTWIDTH] IN BLOOD BY AUTOMATED COUNT: 13.9 % (ref 11.5–14.5)
EST. AVERAGE GLUCOSE BLD GHB EST-MCNC: 131 MG/DL
GLOBULIN SER CALC-MCNC: 3.8 G/DL (ref 2–4)
GLUCOSE SERPL-MCNC: 114 MG/DL (ref 65–100)
GLUCOSE UR STRIP.AUTO-MCNC: NEGATIVE MG/DL
HBA1C MFR BLD: 6.2 % (ref 4–5.6)
HCT VFR BLD AUTO: 46.9 % (ref 36.6–50.3)
HDLC SERPL-MCNC: 42 MG/DL
HDLC SERPL: 4.2 {RATIO} (ref 0–5)
HGB BLD-MCNC: 14.8 G/DL (ref 12.1–17)
HGB UR QL STRIP: NEGATIVE
HYALINE CASTS URNS QL MICRO: NORMAL /LPF (ref 0–5)
IMM GRANULOCYTES # BLD AUTO: 0 K/UL (ref 0–0.04)
IMM GRANULOCYTES NFR BLD AUTO: 0 % (ref 0–0.5)
KETONES UR QL STRIP.AUTO: NEGATIVE MG/DL
LDLC SERPL CALC-MCNC: 112.8 MG/DL (ref 0–100)
LEUKOCYTE ESTERASE UR QL STRIP.AUTO: NEGATIVE
LYMPHOCYTES # BLD: 2 K/UL (ref 0.8–3.5)
LYMPHOCYTES NFR BLD: 21 % (ref 12–49)
MCH RBC QN AUTO: 29.2 PG (ref 26–34)
MCHC RBC AUTO-ENTMCNC: 31.6 G/DL (ref 30–36.5)
MCV RBC AUTO: 92.5 FL (ref 80–99)
MONOCYTES # BLD: 0.6 K/UL (ref 0–1)
MONOCYTES NFR BLD: 7 % (ref 5–13)
NEUTS SEG # BLD: 6.6 K/UL (ref 1.8–8)
NEUTS SEG NFR BLD: 69 % (ref 32–75)
NITRITE UR QL STRIP.AUTO: NEGATIVE
NRBC # BLD: 0 K/UL (ref 0–0.01)
NRBC BLD-RTO: 0 PER 100 WBC
PH UR STRIP: 6 [PH] (ref 5–8)
PLATELET # BLD AUTO: 275 K/UL (ref 150–400)
PMV BLD AUTO: 9.7 FL (ref 8.9–12.9)
POTASSIUM SERPL-SCNC: 4 MMOL/L (ref 3.5–5.1)
PROT SERPL-MCNC: 7.8 G/DL (ref 6.4–8.2)
PROT UR STRIP-MCNC: NEGATIVE MG/DL
PSA SERPL-MCNC: 0 NG/ML (ref 0.01–4)
RBC # BLD AUTO: 5.07 M/UL (ref 4.1–5.7)
RBC #/AREA URNS HPF: NORMAL /HPF (ref 0–5)
SODIUM SERPL-SCNC: 138 MMOL/L (ref 136–145)
SP GR UR REFRACTOMETRY: 1.01 (ref 1–1.03)
T4 SERPL-MCNC: 8.7 UG/DL (ref 4.5–12.1)
TRIGL SERPL-MCNC: 106 MG/DL (ref ?–150)
TSH SERPL DL<=0.05 MIU/L-ACNC: 3.96 UIU/ML (ref 0.36–3.74)
UA: UC IF INDICATED,UAUC: NORMAL
UROBILINOGEN UR QL STRIP.AUTO: 0.2 EU/DL (ref 0.2–1)
VLDLC SERPL CALC-MCNC: 21.2 MG/DL
WBC # BLD AUTO: 9.4 K/UL (ref 4.1–11.1)
WBC URNS QL MICRO: NORMAL /HPF (ref 0–4)

## 2022-01-24 PROCEDURE — G8754 DIAS BP LESS 90: HCPCS | Performed by: INTERNAL MEDICINE

## 2022-01-24 PROCEDURE — 3017F COLORECTAL CA SCREEN DOC REV: CPT | Performed by: INTERNAL MEDICINE

## 2022-01-24 PROCEDURE — G8510 SCR DEP NEG, NO PLAN REQD: HCPCS | Performed by: INTERNAL MEDICINE

## 2022-01-24 PROCEDURE — G8427 DOCREV CUR MEDS BY ELIG CLIN: HCPCS | Performed by: INTERNAL MEDICINE

## 2022-01-24 PROCEDURE — 99214 OFFICE O/P EST MOD 30 MIN: CPT | Performed by: INTERNAL MEDICINE

## 2022-01-24 PROCEDURE — 1101F PT FALLS ASSESS-DOCD LE1/YR: CPT | Performed by: INTERNAL MEDICINE

## 2022-01-24 PROCEDURE — G8536 NO DOC ELDER MAL SCRN: HCPCS | Performed by: INTERNAL MEDICINE

## 2022-01-24 PROCEDURE — G8752 SYS BP LESS 140: HCPCS | Performed by: INTERNAL MEDICINE

## 2022-01-24 PROCEDURE — G8419 CALC BMI OUT NRM PARAM NOF/U: HCPCS | Performed by: INTERNAL MEDICINE

## 2022-01-24 PROCEDURE — G0438 PPPS, INITIAL VISIT: HCPCS | Performed by: INTERNAL MEDICINE

## 2022-01-24 NOTE — PATIENT INSTRUCTIONS
Medicare Wellness Visit, Male    The best way to live healthy is to have a lifestyle where you eat a well-balanced diet, exercise regularly, limit alcohol use, and quit all forms of tobacco/nicotine, if applicable. Regular preventive services are another way to keep healthy. Preventive services (vaccines, screening tests, monitoring & exams) can help personalize your care plan, which helps you manage your own care. Screening tests can find health problems at the earliest stages, when they are easiest to treat. [facility] follows the current, evidence-based guidelines published by the [facility] ([facility]) when recommending preventive services for our patients. Because we follow these guidelines, sometimes recommendations change over time as research supports it. (For example, a prostate screening blood test is no longer routinely recommended for men with no symptoms). Of course, you and your doctor may decide to screen more often for some diseases, based on your risk and co-morbidities (chronic disease you are already diagnosed with). Preventive services for you include:  - Medicare offers their members a free annual wellness visit, which is time for you and your primary care provider to discuss and plan for your preventive service needs. Take advantage of this benefit every year!  -All adults over age 72 should receive the recommended pneumonia vaccines. Current USPSTF guidelines recommend a series of two vaccines for the best pneumonia protection.   -All adults should have a flu vaccine yearly and tetanus vaccine every 10 years.  -All adults age 48 and older should receive the shingles vaccines (series of two vaccines).        -All adults age 38-68 who are overweight should have a diabetes screening test once every three years.   -Other screening tests & preventive services for persons with diabetes include: an eye exam to screen for diabetic retinopathy, a kidney function test, a foot exam, and stricter control over your cholesterol.   -Cardiovascular screening for adults with routine risk involves an electrocardiogram (ECG) at intervals determined by the provider.   -Colorectal cancer screening should be done for adults age 54-65 with no increased risk factors for colorectal cancer. There are a number of acceptable methods of screening for this type of cancer. Each test has its own benefits and drawbacks. Discuss with your provider what is most appropriate for you during your annual wellness visit. The different tests include: colonoscopy (considered the best screening method), a fecal occult blood test, a fecal DNA test, and sigmoidoscopy.  -All adults born between [facility] should be screened once for Hepatitis C.  -An Abdominal Aortic Aneurysm (AAA) Screening is recommended for men age 73-68 who has ever smoked in their lifetime.      Here is a list of your current Health Maintenance items (your personalized list of preventive services) with a due date:  Health Maintenance Due   Topic Date Due    DTaP/Tdap/Td  (1 - Tdap) Never done    Smoker or Former Smoker - [address]  Never done    AAA Screening  Never done    Annual Well Visit  01/22/2022

## 2022-01-24 NOTE — PROGRESS NOTES
Chief Complaint   Patient presents with   [facility] Annual Wellness Visit     Visit Vitals  BP (!) 144/90 (BP 1 Location: Left upper arm, BP Patient Position: Sitting, BP Cuff Size: Adult)   Pulse 71   Temp 98 °F (36.7 °C) (Oral)   Resp 16   Ht 5' 8\" (1.727 m)   Wt 182 lb (82.6 kg)   SpO2 96%   BMI 27.67 kg/m²     1. Have you been to the ER, urgent care clinic since your last visit? Hospitalized since your last visit? No    2. Have you seen or consulted any other health care providers outside of the [address] [place] [address] since your last visit? Include any pap smears or colon screening. Dr. Jacquelyn Spatz specialist      Depression Risk Factor Screening:     3 most recent PHQ Screens 1/24/2022   Little interest or pleasure in doing things Not at all   Feeling down, depressed, irritable, or hopeless Not at all   Total Score PHQ 2 0       Functional Ability and Level of Safety:     Activities of Daily Living  ADL Assessment 1/24/2022   Feeding yourself No Help Needed   Getting from bed to chair No Help Needed   Getting dressed No Help Needed   Bathing or showering No Help Needed   Walk across the room (includes cane/walker) No Help Needed   Using the telphone No Help Needed   Taking your medications No Help Needed   Preparing meals No Help Needed   Managing money (expenses/bills) No Help Needed   Moderately strenuous housework (laundry) No Help Needed   Shopping for personal items (toiletries/medicines) No Help Needed   Shopping for groceries No Help Needed   Driving No Help Needed   Climbing a flight of stairs No Help Needed   Getting to places beyond walking distances No Help Needed       Fall Risk  Fall Risk Assessment, last 12 mths 1/24/2022   Able to walk? Yes   Fall in past 12 months? 0   Do you feel unsteady? 0   Are you worried about falling 0       Abuse Screen  Abuse Screening Questionnaire 1/24/2022   Do you ever feel afraid of your partner?  N   Are you in a relationship with someone who physically or mentally threatens you? N   Is it safe for you to go home?  Y         Patient Care Team   Patient Care Team:  Eloy Graf MD as PCP - General (Internal Medicine)  Eloy Graf MD as PCP - [address] [place] [address] [facility]

## 2022-01-26 DIAGNOSIS — E78.2 MIXED HYPERLIPIDEMIA: ICD-10-CM

## 2022-01-26 RX ORDER — CILOSTAZOL 100 MG/1
TABLET ORAL
Qty: 180 TABLET | Refills: 3 | Status: SHIPPED | OUTPATIENT
Start: 2022-01-26 | End: 2022-07-25 | Stop reason: ALTCHOICE

## 2022-01-26 RX ORDER — SIMVASTATIN 80 MG/1
80 TABLET, FILM COATED ORAL
Qty: 90 TABLET | Refills: 3 | Status: SHIPPED | OUTPATIENT
Start: 2022-01-26 | End: 2022-01-28 | Stop reason: SDUPTHER

## 2022-01-26 RX ORDER — NIACIN 500 MG/1
500 TABLET, EXTENDED RELEASE ORAL
Qty: 90 TABLET | Refills: 3 | Status: SHIPPED | OUTPATIENT
Start: 2022-01-26

## 2022-01-26 NOTE — TELEPHONE ENCOUNTER
RX refill request from the patient/pharmacy. Patient last seen 01- with labs, and next appt. scheduled for 07-  Requested Prescriptions     Pending Prescriptions Disp Refills    niacin ER (NIASPAN) 500 mg tablet 90 Tablet 3     Sig: Take 1 Tablet by mouth nightly.  cilostazoL (PLETAL) 100 mg tablet 180 Tablet 3     Sig: TAKE 1 TABLET BEFORE  BREAKFAST AND DINNER   .

## 2022-01-26 NOTE — PROGRESS NOTES
Vitamin D level is low so start vitamin D at 1000 units daily.  Other labs are okay. Letter generated to patient regarding.

## 2022-01-26 NOTE — TELEPHONE ENCOUNTER
PCP: Baudilio Mcclendon MD    Last appt: 2022  Future Appointments   Date Time Provider Lenny Bautista   2022  8:00 AM Baudilio Mcclendon MD [facility] BS AMB       Requested Prescriptions     Pending Prescriptions Disp Refills    simvastatin (ZOCOR) 80 mg tablet 90 Tablet 3     Si Tablet daily.        Prior labs and Blood pressures:  BP Readings from Last 3 Encounters:   22 118/76   21 118/76   21 126/76     Lab Results   Component Value Date/Time    Sodium 138 2022 09:09 AM    Potassium 4.0 2022 09:09 AM    Chloride 104 2022 09:09 AM    CO2 27 2022 09:09 AM    Anion gap 7 2022 09:09 AM    Glucose 114 (H) 2022 09:09 AM    BUN 15 2022 09:09 AM    Creatinine 0.89 2022 09:09 AM    BUN/Creatinine ratio 17 2022 09:09 AM    GFR est AA >60 2022 09:09 AM    GFR est non-AA >60 2022 09:09 AM    Calcium 9.3 2022 09:09 AM     Lab Results   Component Value Date/Time    Hemoglobin A1c 6.2 (H) 2022 09:09 AM    Hemoglobin A1c (POC) 5.6 2017 08:42 AM     Lab Results   Component Value Date/Time    Cholesterol, total 176 2022 09:09 AM    Cholesterol (POC) 138 2018 12:54 PM    HDL Cholesterol 42 2022 09:09 AM    HDL Cholesterol (POC) 51 2018 12:54 PM    LDL Cholesterol (POC) 72.2 2018 12:54 PM    LDL, calculated 112.8 (H) 2022 09:09 AM    VLDL, calculated 21.2 2022 09:09 AM    Triglyceride 106 2022 09:09 AM    Triglycerides (POC) 74 2018 12:54 PM    CHOL/HDL Ratio 4.2 2022 09:09 AM     Lab Results   Component Value Date/Time    Vitamin D 25-Hydroxy 24.0 (L) 2022 09:09 AM       Lab Results   Component Value Date/Time    TSH 3.96 (H) 2022 09:09 AM    TSH, 3rd generation 2.78 2021 08:06 AM

## 2022-01-26 NOTE — TELEPHONE ENCOUNTER
RX refill request from the patient/pharmacy. Patient last seen 01- with labs, and next appt. scheduled for 07-  Requested Prescriptions     Pending Prescriptions Disp Refills    simvastatin (ZOCOR) 80 mg tablet 90 Tablet 3     Sig: Take 1 Tablet by mouth nightly. Ginna Garrett

## 2022-01-28 DIAGNOSIS — E78.2 MIXED HYPERLIPIDEMIA: ICD-10-CM

## 2022-01-28 RX ORDER — SIMVASTATIN 80 MG/1
80 TABLET, FILM COATED ORAL
Qty: 90 TABLET | Refills: 3 | Status: SHIPPED | OUTPATIENT
Start: 2022-01-28

## 2022-01-28 NOTE — TELEPHONE ENCOUNTER
PCP: Erasto Orellana MD    Last appt: 1/24/2022  Future Appointments   Date Time Provider Lenny Bautista   7/25/2022  8:00 AM Erasto Orellana MD [facility] BS AMB       Requested Prescriptions     Pending Prescriptions Disp Refills    simvastatin (ZOCOR) 80 mg tablet 90 Tablet 3     Sig: Take 1 Tablet by mouth nightly.        Prior labs and Blood pressures:  BP Readings from Last 3 Encounters:   01/24/22 118/76   07/21/21 118/76   01/21/21 126/76     Lab Results   Component Value Date/Time    Sodium 138 01/24/2022 09:09 AM    Potassium 4.0 01/24/2022 09:09 AM    Chloride 104 01/24/2022 09:09 AM    CO2 27 01/24/2022 09:09 AM    Anion gap 7 01/24/2022 09:09 AM    Glucose 114 (H) 01/24/2022 09:09 AM    BUN 15 01/24/2022 09:09 AM    Creatinine 0.89 01/24/2022 09:09 AM    BUN/Creatinine ratio 17 01/24/2022 09:09 AM    GFR est AA >60 01/24/2022 09:09 AM    GFR est non-AA >60 01/24/2022 09:09 AM    Calcium 9.3 01/24/2022 09:09 AM     Lab Results   Component Value Date/Time    Hemoglobin A1c 6.2 (H) 01/24/2022 09:09 AM    Hemoglobin A1c (POC) 5.6 12/22/2017 08:42 AM     Lab Results   Component Value Date/Time    Cholesterol, total 176 01/24/2022 09:09 AM    Cholesterol (POC) 138 06/29/2018 12:54 PM    HDL Cholesterol 42 01/24/2022 09:09 AM    HDL Cholesterol (POC) 51 06/29/2018 12:54 PM    LDL Cholesterol (POC) 72.2 06/29/2018 12:54 PM    LDL, calculated 112.8 (H) 01/24/2022 09:09 AM    VLDL, calculated 21.2 01/24/2022 09:09 AM    Triglyceride 106 01/24/2022 09:09 AM    Triglycerides (POC) 74 06/29/2018 12:54 PM    CHOL/HDL Ratio 4.2 01/24/2022 09:09 AM     Lab Results   Component Value Date/Time    Vitamin D 25-Hydroxy 24.0 (L) 01/24/2022 09:09 AM       Lab Results   Component Value Date/Time    TSH 3.96 (H) 01/24/2022 09:09 AM    TSH, 3rd generation 2.78 01/22/2021 08:06 AM

## 2022-02-20 PROBLEM — Z00.00 MEDICARE ANNUAL WELLNESS VISIT, INITIAL: Status: RESOLVED | Noted: 2022-01-21 | Resolved: 2022-02-20

## 2022-03-18 PROBLEM — D48.5 NEOPLASM OF UNCERTAIN BEHAVIOR OF SKIN: Status: ACTIVE | Noted: 2019-07-29

## 2022-03-18 PROBLEM — N52.9 ED (ERECTILE DYSFUNCTION): Status: ACTIVE | Noted: 2017-12-21

## 2022-03-18 PROBLEM — H02.403 PTOSIS OF EYELID, BILATERAL: Status: ACTIVE | Noted: 2019-05-02

## 2022-03-19 PROBLEM — I70.90 ASVD (ARTERIOSCLEROTIC VASCULAR DISEASE): Status: ACTIVE | Noted: 2017-12-21

## 2022-03-19 PROBLEM — Z87.891 HISTORY OF TOBACCO ABUSE: Status: ACTIVE | Noted: 2017-12-21

## 2022-03-19 PROBLEM — C61 PROSTATE CARCINOMA (HCC): Status: ACTIVE | Noted: 2017-12-21

## 2022-03-19 PROBLEM — E55.9 VITAMIN D DEFICIENCY: Status: ACTIVE | Noted: 2022-01-21

## 2022-03-19 PROBLEM — Z13.39 ALCOHOL SCREENING: Status: ACTIVE | Noted: 2017-12-22

## 2022-03-19 PROBLEM — E78.2 MIXED HYPERLIPIDEMIA: Status: ACTIVE | Noted: 2017-12-20

## 2022-03-19 PROBLEM — I10 ESSENTIAL HYPERTENSION: Status: ACTIVE | Noted: 2017-12-21

## 2022-03-20 PROBLEM — M15.0 PRIMARY OSTEOARTHRITIS INVOLVING MULTIPLE JOINTS: Status: ACTIVE | Noted: 2018-06-28

## 2022-03-20 PROBLEM — M15.9 PRIMARY OSTEOARTHRITIS INVOLVING MULTIPLE JOINTS: Status: ACTIVE | Noted: 2018-06-28

## 2022-03-20 PROBLEM — R73.02 GLUCOSE INTOLERANCE (IMPAIRED GLUCOSE TOLERANCE): Status: ACTIVE | Noted: 2017-12-21

## 2022-03-28 ENCOUNTER — OFFICE VISIT (OUTPATIENT)
Dept: INTERNAL MEDICINE CLINIC | Age: 72
End: 2022-03-28
Payer: MEDICARE

## 2022-03-28 VITALS
HEIGHT: 68 IN | WEIGHT: 179.7 LBS | RESPIRATION RATE: 17 BRPM | HEART RATE: 70 BPM | DIASTOLIC BLOOD PRESSURE: 86 MMHG | SYSTOLIC BLOOD PRESSURE: 138 MMHG | BODY MASS INDEX: 27.23 KG/M2 | TEMPERATURE: 98.1 F | OXYGEN SATURATION: 98 %

## 2022-03-28 DIAGNOSIS — L23.9 ALLERGIC DERMATITIS: Primary | ICD-10-CM

## 2022-03-28 PROCEDURE — G8427 DOCREV CUR MEDS BY ELIG CLIN: HCPCS | Performed by: INTERNAL MEDICINE

## 2022-03-28 PROCEDURE — G8510 SCR DEP NEG, NO PLAN REQD: HCPCS | Performed by: INTERNAL MEDICINE

## 2022-03-28 PROCEDURE — 99213 OFFICE O/P EST LOW 20 MIN: CPT | Performed by: INTERNAL MEDICINE

## 2022-03-28 PROCEDURE — G8419 CALC BMI OUT NRM PARAM NOF/U: HCPCS | Performed by: INTERNAL MEDICINE

## 2022-03-28 PROCEDURE — 3017F COLORECTAL CA SCREEN DOC REV: CPT | Performed by: INTERNAL MEDICINE

## 2022-03-28 PROCEDURE — G8754 DIAS BP LESS 90: HCPCS | Performed by: INTERNAL MEDICINE

## 2022-03-28 PROCEDURE — 1101F PT FALLS ASSESS-DOCD LE1/YR: CPT | Performed by: INTERNAL MEDICINE

## 2022-03-28 PROCEDURE — G8752 SYS BP LESS 140: HCPCS | Performed by: INTERNAL MEDICINE

## 2022-03-28 PROCEDURE — G8536 NO DOC ELDER MAL SCRN: HCPCS | Performed by: INTERNAL MEDICINE

## 2022-03-28 RX ORDER — PREDNISONE 10 MG/1
10 TABLET ORAL SEE ADMIN INSTRUCTIONS
Qty: 48 TABLET | Refills: 0 | Status: SHIPPED | OUTPATIENT
Start: 2022-03-28 | End: 2022-07-25 | Stop reason: ALTCHOICE

## 2022-03-28 NOTE — PATIENT INSTRUCTIONS
Dermatitis: Care Instructions  Overview  Dermatitis is the general name used for any rash or inflammation of the skin. Different kinds of dermatitis cause different kinds of rashes. Common causes of a rash include new medicines, plants (such as poison oak or poison ivy), heat, and stress. Certain illnesses can also cause a rash. An allergic reaction to something that touches your skin, such as latex, nickel, or poison ivy, is called contact dermatitis. Contact dermatitis may also be caused by something that irritates the skin, such as bleach, a chemical, or soap. These types of rashes cannot be spread from person to person. How long your rash will last depends on what caused it. Rashes may last a few days or months. Follow-up care is a key part of your treatment and safety. Be sure to make and go to all appointments, and call your doctor if you are having problems. It's also a good idea to know your test results and keep a list of the medicines you take. How can you care for yourself at home? · Do not scratch the rash. Cut your nails short, and file them smooth. Or wear gloves if this helps keep you from scratching. · Wash the area with water only. Pat dry. · Put cold, wet cloths on the rash to reduce itching. · Keep cool, and stay out of the sun. · Leave the rash open to the air as much as possible. · If the rash itches, use hydrocortisone cream. Follow the directions on the label. Calamine lotion may help for plant rashes. · If itching affects your sleep, ask your doctor if you can take an antihistamine that might reduce itching and make you sleepy, such as diphenhydramine (Benadryl). Be safe with medicines. Read and follow all instructions on the label. · If your doctor prescribed a cream, use it as directed. If your doctor prescribed medicine, take it exactly as directed. When should you call for help?    Call your doctor now or seek immediate medical care if:    · You have symptoms of infection, such as:  ? Increased pain, swelling, warmth, or redness. ? Red streaks leading from the area. ? Pus draining from the area. ? A fever.     · You have joint pain along with the rash. Watch closely for changes in your health, and be sure to contact your doctor if:    · Your rash is changing or getting worse.     · You are not getting better as expected. Where can you learn more? Go to http://meghan-nicole.info/  Enter F270 in the search box to learn more about \"Dermatitis: Care Instructions. \"  Current as of: November 15, 2021               Content Version: 13.2  © 0868-0580 [employer]. Care instructions adapted under license by [employer] (which disclaims liability or warranty for this information). If you have questions about a medical condition or this instruction, always ask your healthcare professional. [address] any warranty or liability for your use of this information.

## 2022-03-28 NOTE — PROGRESS NOTES
Subjective:   Jan Mir is a 73 y.o. male      Chief Complaint   Patient presents with    Eyelid Inflammation     bilateral eyelid inflammation x 1 month        History of present illness: He presents complaining of bilateral pruritic rash over the upper eyelids has been present for the past month. [facility] may prednisone 4 mg 6-day Dosepak which seemed to clear it up but then it seemed to come back again. He notes it seems to be very pruritic in nature and seems to be spreading onto the forehead. He notes no other complaints.     Patient Active Problem List   Diagnosis Code    Mixed hyperlipidemia E78.2    ED (erectile dysfunction) N52.9    History of tobacco abuse Z87.891    Prostate carcinoma ([facility] C61    Essential hypertension I10    Glucose intolerance (impaired glucose tolerance) R73.02    ASVD (arteriosclerotic vascular disease) I70.90    Alcohol screening Z13.39    Primary osteoarthritis involving multiple joints M89.49    Ptosis of eyelid, bilateral H02.403    Neoplasm of uncertain behavior of skin D48.5    Vitamin D deficiency E55.9    Allergic dermatitis L23.9      Past Medical History:   Diagnosis Date    Arthritis of multiple sites 12/21/2017    ASVD (arteriosclerotic vascular disease) 12/21/2017    Cancer ([facility]     prostate    DJD (degenerative joint disease) 12/21/2017    ED (erectile dysfunction) 12/21/2017    Glucose intolerance (impaired glucose tolerance) 12/21/2017    History of tobacco abuse 12/21/2017    HTN (hypertension) 12/21/2017    Hypercholesterolemia     Hyperlipidemia 12/21/2017    On long term drug therapy 12/21/2017    PAD (peripheral artery disease) (HCC)     left leg    Prostate carcinoma (HCC) 12/21/2017      Allergies   Allergen Reactions    Bee Sting [Sting, Bee] Swelling     Swelling (local area of sting) - per COLT Wolfe in PAT    Penicillins Unknown (comments)      Family History   Problem Relation Age of Onset    Diabetes Mother     Alcohol abuse Father       Social History     Socioeconomic History    Marital status:      Spouse name: Not on file    Number of children: Not on file    Years of education: Not on file    Highest education level: Not on file   Occupational History    Not on file   Tobacco Use    Smoking status: Former Smoker     Packs/day: 1.00     Years: 45.00     Pack years: 45.00     Quit date: 2012     Years since quitting: 10.2    Smokeless tobacco: Never Used   Vaping Use    Vaping Use: Never used   Substance and Sexual Activity    Alcohol use: Yes     Alcohol/week: 5.0 standard drinks     Types: 6 Cans of beer per week    Drug use: No    Sexual activity: Yes     Partners: Female     Birth control/protection: Surgical   Other Topics Concern    Not on file   Social History Narrative    Not on file     Social Determinants of Health     Financial Resource Strain:     Difficulty of Paying Living Expenses: Not on file   Food Insecurity:     Worried About Running Out of Food in the Last Year: Not on file    Cynthia of Food in the Last Year: Not on file   Transportation Needs:     Lack of Transportation (Medical): Not on file    Lack of Transportation (Non-Medical):  Not on file   Physical Activity:     Days of Exercise per Week: Not on file    Minutes of Exercise per Session: Not on file   Stress:     Feeling of Stress : Not on file   Social Connections:     Frequency of Communication with Friends and Family: Not on file    Frequency of Social Gatherings with Friends and Family: Not on file    Attends Protestant Services: Not on file    Active Member of Clubs or Organizations: Not on file    Attends Club or Organization Meetings: Not on file    Marital Status: Not on file   Intimate Partner Violence:     Fear of Current or Ex-Partner: Not on file    Emotionally Abused: Not on file    Physically Abused: Not on file    Sexually Abused: Not on file   Housing Stability:     Unable to Pay for Housing in the Last Year: Not on file    Number of Places Lived in the Last Year: Not on file    Unstable Housing in the Last Year: Not on file     Prior to Admission medications    Medication Sig Start Date End Date Taking? Authorizing Provider   predniSONE (STERAPRED DS) 10 mg dose pack Take 1 Tablet by mouth See Admin Instructions. See administration instruction per 10mg dose pack 3/28/22  Yes Marshal Martinez MD   simvastatin (ZOCOR) 80 mg tablet Take 1 Tablet by mouth nightly. 1/28/22  Yes Marshal Martinez MD   niacin ER (NIASPAN) 500 mg tablet Take 1 Tablet by mouth nightly. 1/26/22  Yes Marshal Martinez MD   cilostazoL (PLETAL) 100 mg tablet TAKE 1 TABLET BEFORE  BREAKFAST AND DINNER 1/26/22  Yes Marshal Martinez MD   vit A/vit C/vit E/zinc/copper (PRESERVISION AREDS PO) Take 2 Tablets by mouth daily. Yes Provider, Historical   aspirin (ASPIRIN) 325 mg tablet Take 325 mg by mouth daily. Yes Provider, Historical        Review of Systems              Constitutional:  He denies fever, weight loss, sweats or fatigue. EYES: No blurred or double vision,               ENT: no nasal congestion, no headache or dizziness. No difficulty with               swallowing, taste, speech or smell. Respiratory:  No cough, wheezing or shortness of breath. No sputum production. Cardiac:  Denies chest pain, palpitations, unexplained indigestion, syncope, edema, PND or orthopnea. GI:  No changes in bowel movements, no abdominal pain, no bloating, anorexia, nausea, vomiting or heartburn. :  No frequency or dysuria. Denies incontinence or sexual dysfunction. Extremities:  No joint pain, stiffness or swelling  Back:.no pain or soreness  Skin:  No recent rashes or mole changes except rash is noted on upper eyelids and forehead. Neurological:  No numbness, tingling, burning paresthesias or loss of motor strength.   No syncope, dizziness, frequent headaches or memory loss.  Hematologic:  No easy bruising  Lymphatic: No lymph node enlargement    Objective:     Vitals:    03/28/22 1056   BP: 138/86   Pulse: 70   Resp: 17   Temp: 98.1 °F (36.7 °C)   TempSrc: Oral   SpO2: 98%   Weight: 179 lb 11.2 oz (81.5 kg)   Height: 5' 8\" (1.727 m)   PainSc:   0 - No pain       Body mass index is 27.32 kg/m². Physical Examination:              General Appearance:  Well-developed, well-nourished, no acute distress. HEENT:      Ears:  The TMs and ear canals were clear. Eyes:  The pupillary responses were normal.  Extraocular muscle function intact. Lids and conjunctiva not injected. Funduscopic exam revealed sharp disc margins. Nares: Clear w/o edema or erythema  Pharynx:  Clear with teeth in good repair. No masses were noted. Neck:  Supple without thyromegaly or adenopathy. No JVD noted. No carotid                bruits. Lungs:  Clear to auscultation and percussion. Cardiac:  Regular rate and rhythm without murmur. PMI not displaced. No gallop, rub or click. Abdominal: Soft, non-tender, no hepata-spleenomegally or masses  Extremities:  No clubbing, cyanosis or edema. Skin:  No rash or unusual mole changes noted except dry erythematous scaly rash over her eyelids and part of the forehead consistent with allergic dermatitis or eczema. Lymph Nodes:  None felt in the cervical, supraclavicular, axillary or inguinal region. Neurological: . DTRs 2+ and symmetric. Station and gait normal.   Hematologic:   No purpura or petechiae        Assessment/Plan:         1. Allergic dermatitis        Impressions/Plan:  Impression  1. Allergic dermatitis begun try prednisone 10 mg 12-day Dosepak and if this is not effective may need to see dermatologist  Recheck otherwise per previous schedule. Orders Placed This Encounter    predniSONE (STERAPRED DS) 10 mg dose pack       Follow-up and Dispositions    · Return At regular schedule appointment.          No results found for any visits on 03/28/22. Kenton Warner MD    The patient was given after the visit summary the patient verbalized an understanding of the plans and problems as explained.

## 2022-03-28 NOTE — PROGRESS NOTES
Chief Complaint   Patient presents with    Eyelid Inflammation     bilateral eyelid inflammation x 1 month     Visit Vitals  /86 (BP 1 Location: Left upper arm, BP Patient Position: Sitting, BP Cuff Size: Adult)   Pulse 70   Temp 98.1 °F (36.7 °C) (Oral)   Resp 17   Ht 5' 8\" (1.727 m)   Wt 179 lb 11.2 oz (81.5 kg)   SpO2 98%   BMI 27.32 kg/m²     1. Have you been to the ER, urgent care clinic since your last visit? Hospitalized since your last visit? [facility] for [facility]id inflammation    2. Have you seen or consulted any other health care providers outside of the [address] [place] [address] since your last visit? Include any pap smears or colon screening.  Dr. Sonia Pelaez

## 2022-07-25 ENCOUNTER — OFFICE VISIT (OUTPATIENT)
Dept: INTERNAL MEDICINE CLINIC | Age: 72
End: 2022-07-25
Payer: MEDICARE

## 2022-07-25 VITALS
WEIGHT: 179.6 LBS | BODY MASS INDEX: 27.22 KG/M2 | OXYGEN SATURATION: 97 % | HEART RATE: 66 BPM | DIASTOLIC BLOOD PRESSURE: 76 MMHG | SYSTOLIC BLOOD PRESSURE: 128 MMHG | RESPIRATION RATE: 16 BRPM | HEIGHT: 68 IN | TEMPERATURE: 98.4 F

## 2022-07-25 DIAGNOSIS — R73.02 GLUCOSE INTOLERANCE (IMPAIRED GLUCOSE TOLERANCE): ICD-10-CM

## 2022-07-25 DIAGNOSIS — E78.2 MIXED HYPERLIPIDEMIA: ICD-10-CM

## 2022-07-25 DIAGNOSIS — M25.511 CHRONIC RIGHT SHOULDER PAIN: ICD-10-CM

## 2022-07-25 DIAGNOSIS — I10 ESSENTIAL HYPERTENSION: Primary | ICD-10-CM

## 2022-07-25 DIAGNOSIS — Z87.891 HISTORY OF TOBACCO ABUSE: ICD-10-CM

## 2022-07-25 DIAGNOSIS — G89.29 CHRONIC RIGHT SHOULDER PAIN: ICD-10-CM

## 2022-07-25 DIAGNOSIS — M15.9 PRIMARY OSTEOARTHRITIS INVOLVING MULTIPLE JOINTS: ICD-10-CM

## 2022-07-25 LAB
ALBUMIN SERPL-MCNC: 3.8 G/DL (ref 3.5–5)
ALBUMIN/GLOB SERPL: 1.1 {RATIO} (ref 1.1–2.2)
ALP SERPL-CCNC: 84 U/L (ref 45–117)
ALT SERPL-CCNC: 57 U/L (ref 12–78)
ANION GAP SERPL CALC-SCNC: 7 MMOL/L (ref 5–15)
AST SERPL-CCNC: 33 U/L (ref 15–37)
BILIRUB SERPL-MCNC: 0.5 MG/DL (ref 0.2–1)
BUN SERPL-MCNC: 19 MG/DL (ref 6–20)
BUN/CREAT SERPL: 24 (ref 12–20)
CALCIUM SERPL-MCNC: 9.2 MG/DL (ref 8.5–10.1)
CHLORIDE SERPL-SCNC: 104 MMOL/L (ref 97–108)
CHOLEST SERPL-MCNC: 159 MG/DL
CK SERPL-CCNC: 166 U/L (ref 39–308)
CO2 SERPL-SCNC: 26 MMOL/L (ref 21–32)
CREAT SERPL-MCNC: 0.78 MG/DL (ref 0.7–1.3)
EST. AVERAGE GLUCOSE BLD GHB EST-MCNC: 137 MG/DL
GLOBULIN SER CALC-MCNC: 3.5 G/DL (ref 2–4)
GLUCOSE SERPL-MCNC: 116 MG/DL (ref 65–100)
HBA1C MFR BLD: 6.4 % (ref 4–5.6)
HDLC SERPL-MCNC: 38 MG/DL
HDLC SERPL: 4.2 {RATIO} (ref 0–5)
LDLC SERPL CALC-MCNC: 93.6 MG/DL (ref 0–100)
POTASSIUM SERPL-SCNC: 4.5 MMOL/L (ref 3.5–5.1)
PROT SERPL-MCNC: 7.3 G/DL (ref 6.4–8.2)
SODIUM SERPL-SCNC: 137 MMOL/L (ref 136–145)
TRIGL SERPL-MCNC: 137 MG/DL (ref ?–150)
VLDLC SERPL CALC-MCNC: 27.4 MG/DL

## 2022-07-25 PROCEDURE — G8427 DOCREV CUR MEDS BY ELIG CLIN: HCPCS | Performed by: INTERNAL MEDICINE

## 2022-07-25 PROCEDURE — 99214 OFFICE O/P EST MOD 30 MIN: CPT | Performed by: INTERNAL MEDICINE

## 2022-07-25 PROCEDURE — G8754 DIAS BP LESS 90: HCPCS | Performed by: INTERNAL MEDICINE

## 2022-07-25 PROCEDURE — 1101F PT FALLS ASSESS-DOCD LE1/YR: CPT | Performed by: INTERNAL MEDICINE

## 2022-07-25 PROCEDURE — G8536 NO DOC ELDER MAL SCRN: HCPCS | Performed by: INTERNAL MEDICINE

## 2022-07-25 PROCEDURE — 3017F COLORECTAL CA SCREEN DOC REV: CPT | Performed by: INTERNAL MEDICINE

## 2022-07-25 PROCEDURE — G8752 SYS BP LESS 140: HCPCS | Performed by: INTERNAL MEDICINE

## 2022-07-25 PROCEDURE — G8510 SCR DEP NEG, NO PLAN REQD: HCPCS | Performed by: INTERNAL MEDICINE

## 2022-07-25 PROCEDURE — 1123F ACP DISCUSS/DSCN MKR DOCD: CPT | Performed by: INTERNAL MEDICINE

## 2022-07-25 PROCEDURE — G8417 CALC BMI ABV UP PARAM F/U: HCPCS | Performed by: INTERNAL MEDICINE

## 2022-07-25 RX ORDER — MELOXICAM 15 MG/1
15 TABLET ORAL DAILY
Qty: 30 TABLET | Refills: 0 | Status: SHIPPED | OUTPATIENT
Start: 2022-07-25

## 2022-07-25 RX ORDER — ASPIRIN 325 MG
325 TABLET ORAL DAILY
Qty: 30 TABLET | OUTPATIENT
Start: 2022-07-25

## 2022-07-25 NOTE — PROGRESS NOTES
Chief Complaint   Patient presents with    Shoulder Pain    Hypertension    Cholesterol Problem      1. Have you been to the ER, urgent care clinic since your last visit? Hospitalized since your last visit?no      2. Have you seen or consulted any other health care providers outside of the [address] [place] [address] since your last visit? Include any pap smears or colon screening.  No

## 2022-07-25 NOTE — PROGRESS NOTES
Chief Complaint   Patient presents with    Shoulder Pain    Hypertension    Cholesterol Problem    Blood sugar problem       SUBJECTIVE:    Steven Robles is a 81 y.o. male who returns in follow-up of his medical problems include hypertension, ASVD, glucose intolerance, hyperlipidemia, prostate carcinoma, prior tobacco abuse and other medical problems. He denies any chest pain, shortness of breath, palpitations, PND, orthopnea or other cardiac respiratory complaints. He notes no GI or  complaints. He notes no headaches, dizziness or neurologic complaints. He does have some problems with his right shoulder that have been bothering for 2 to 3 months. He does not remember injuring although he did fall off of a ladder shortly before that but does not remember injuring with that fall. He notes no other arthritic complaints and there are no other complaints on complete review of systems. Current Outpatient Medications   Medication Sig Dispense Refill    aspirin (ASPIRIN) 325 mg tablet Take 1 Tablet by mouth in the morning. 30 Tablet PRN    meloxicam (MOBIC) 15 mg tablet Take 1 Tablet by mouth in the morning. 30 Tablet 0    simvastatin (ZOCOR) 80 mg tablet Take 1 Tablet by mouth nightly. 90 Tablet 3    niacin ER (NIASPAN) 500 mg tablet Take 1 Tablet by mouth nightly. 90 Tablet 3    vit A/vit C/vit E/zinc/copper (PRESERVISION AREDS PO) Take 2 Tablets by mouth daily.        Past Medical History:   Diagnosis Date    Arthritis of multiple sites 12/21/2017    ASVD (arteriosclerotic vascular disease) 12/21/2017    Cancer ([facility] [address]     prostate    DJD (degenerative joint disease) 12/21/2017    ED (erectile dysfunction) 12/21/2017    Glucose intolerance (impaired glucose tolerance) 12/21/2017    History of tobacco abuse 12/21/2017    HTN (hypertension) 12/21/2017    Hypercholesterolemia     Hyperlipidemia 12/21/2017    On long term drug therapy 12/21/2017    PAD (peripheral artery disease) (HCC)     left leg    Prostate carcinoma [facility] 12/21/2017     Past Surgical History:   Procedure Laterality Date    HX GI      HX HEMORRHOIDECTOMY      VASCULAR SURGERY PROCEDURE UNLIST      left leg stent     Allergies   Allergen Reactions    Bee Sting [Sting, Bee] Swelling     Swelling (local area of sting) - per RN Goldie Life in PAT    Penicillins Unknown (comments)       REVIEW OF SYSTEMS:  General: negative for - chills or fever, or weight loss or gain  ENT: negative for - headaches, nasal congestion or tinnitus  Eyes: no blurred or visual changes  Neck: No stiffness or swollen nodes  Respiratory: negative for - cough, hemoptysis, shortness of breath or wheezing  Cardiovascular : negative for - chest pain, edema, palpitations or shortness of breath  Gastrointestinal: negative for - abdominal pain, blood in stools, heartburn or nausea/vomiting  Genito-Urinary: no dysuria, trouble voiding, or hematuria  Musculoskeletal: negative for - gait disturbance, joint pain, joint stiffness or joint swelling except right shoulder pain with slight decreased range of motion as well  Neurological: no TIA or stroke symptoms  Hematologic: no bruises, no bleeding  Lymphatic: no swollen glands  Integument: no lumps, mole changes, nail changes or rash  Endocrine:no malaise/lethargy poly uria or polydipsia or unexpected weight changes        Social History     Socioeconomic History    Marital status:    Tobacco Use    Smoking status: Former     Packs/day: 1.00     Years: 45.00     Pack years: 45.00     Types: Cigarettes     Quit date: 2012     Years since quitting: 10.5    Smokeless tobacco: Never   Vaping Use    Vaping Use: Never used   Substance and Sexual Activity    Alcohol use:  Yes     Alcohol/week: 5.0 standard drinks     Types: 6 Cans of beer per week    Drug use: No    Sexual activity: Yes     Partners: Female     Birth control/protection: Surgical     Family History   Problem Relation Age of Onset    Diabetes Mother     Alcohol abuse Father OBJECTIVE:     Visit Vitals  /76   Pulse 66   Temp 98.4 °F (36.9 °C) (Oral)   Resp 16   Ht 5' 8\" (1.727 m)   Wt 179 lb 9.6 oz (81.5 kg)   SpO2 97%   BMI 27.31 kg/m²     CONSTITUTIONAL:   well nourished, appears age appropriate  EYES: sclera anicteric, PERRL, EOMI  ENMT:nares clear, moist mucous membranes, pharynx clear  NECK: supple. Thyroid normal, No JVD or bruits  RESPIRATORY: Chest: clear to ascultation and percussion, normal inspiratory effort  CARDIOVASCULAR: Heart: regular rate and rhythm no murmurs, rubs or gallops, PMI not displaced, No thrills, no peripheral edema  GASTROINTESTINAL: Abdomen: non distended, soft, non tender, bowel sounds normal  HEMATOLOGIC: no purpura, petechiae or bruising  LYMPHATIC: No lymph node enlargemant  MUSCULOSKELETAL: Extremities: no active synovitis, pulse 1+   INTEGUMENT: No unusual rashes or suspicious skin lesions noted. Nails appear normal.  PERIPHERAL VASCULAR: normal pulses femoral, PT and DP  NEUROLOGIC: non-focal exam, A & O X 3  PSYCHIATRIC:, appropriate affect     ASSESSMENT:   1. Essential hypertension    2. Glucose intolerance (impaired glucose tolerance)    3. Mixed hyperlipidemia    4. Primary osteoarthritis involving multiple joints    5. History of tobacco abuse    6. Chronic right shoulder pain      Impression  1. Hypertension is controlled so continue current therapy reviewed with him. 2.  Glucose intolerance repeat status pending a prior lab reviewed and I will adjust if needed. 3.  Hyperlipidemia prior lab reviewed repeat status pending next #4 DJD that is stable next #5 prior history of tobacco abuse no longer smokes  6. Right shoulder pain x-ray obtained today does show some arthritic changes. We will try meloxicam and if he has not improved in a couple of weeks and I think we need to proceed with an MRI  Follow-up with me again in 6 months or sooner if there is a problem.     PLAN:  .  Orders Placed This Encounter    XR SHOULDER RT AP/LAT MIN 2 V    LIPID PANEL    METABOLIC PANEL, COMPREHENSIVE    CK    HEMOGLOBIN A1C WITH EAG    aspirin (ASPIRIN) 325 mg tablet    meloxicam (MOBIC) 15 mg tablet         ATTENTION:   This medical record was transcribed using an electronic medical records system. Although proofread, it may and can contain electronic and spelling errors. Other human spelling and other errors may be present. Corrections may be executed at a later time. Please feel free to contact us for any clarifications as needed. Follow-up and Dispositions    Return in about 6 months (around 1/25/2023). No results found for any visits on 07/25/22. Kalina Kidd MD    The patient verbalized understanding of the problems and plans as explained.

## 2022-08-02 RX ORDER — ROSUVASTATIN CALCIUM 20 MG/1
20 TABLET, COATED ORAL
Qty: 90 TABLET | Refills: 3 | Status: SHIPPED | OUTPATIENT
Start: 2022-08-02

## 2022-08-02 NOTE — TELEPHONE ENCOUNTER
RX refill request from the patient/pharmacy. Patient last seen 07- with labs, and next appt. scheduled for 01-  Requested Prescriptions     Pending Prescriptions Disp Refills    rosuvastatin (CRESTOR) 20 mg tablet 90 Tablet 3     Sig: Take 1 Tablet by mouth nightly. Hoa Cheng

## 2022-08-03 DIAGNOSIS — M25.511 RIGHT SHOULDER PAIN, UNSPECIFIED CHRONICITY: Primary | ICD-10-CM

## 2022-08-15 ENCOUNTER — HOSPITAL ENCOUNTER (OUTPATIENT)
Dept: MRI IMAGING | Age: 72
Discharge: HOME | End: 2022-08-15
Attending: INTERNAL MEDICINE
Payer: MEDICARE

## 2022-08-15 DIAGNOSIS — M12.811 ROTATOR CUFF TEAR ARTHROPATHY, RIGHT: Primary | ICD-10-CM

## 2022-08-15 DIAGNOSIS — M25.511 RIGHT SHOULDER PAIN, UNSPECIFIED CHRONICITY: ICD-10-CM

## 2022-08-15 DIAGNOSIS — M19.90 ARTHRITIS: ICD-10-CM

## 2022-08-15 DIAGNOSIS — M75.101 ROTATOR CUFF TEAR ARTHROPATHY, RIGHT: Primary | ICD-10-CM

## 2022-08-15 PROCEDURE — 73221 MRI JOINT UPR EXTREM W/O DYE: CPT

## 2022-08-26 ENCOUNTER — OFFICE VISIT (OUTPATIENT)
Dept: ORTHOPEDIC SURGERY | Age: 72
End: 2022-08-26
Payer: MEDICARE

## 2022-08-26 VITALS — WEIGHT: 175 LBS | BODY MASS INDEX: 26.52 KG/M2 | HEIGHT: 68 IN

## 2022-08-26 DIAGNOSIS — M19.011 PRIMARY OSTEOARTHRITIS, RIGHT SHOULDER: Primary | ICD-10-CM

## 2022-08-26 PROCEDURE — 99203 OFFICE O/P NEW LOW 30 MIN: CPT | Performed by: ORTHOPAEDIC SURGERY

## 2022-08-26 PROCEDURE — 1123F ACP DISCUSS/DSCN MKR DOCD: CPT | Performed by: ORTHOPAEDIC SURGERY

## 2022-08-26 PROCEDURE — G8432 DEP SCR NOT DOC, RNG: HCPCS | Performed by: ORTHOPAEDIC SURGERY

## 2022-08-26 PROCEDURE — 1101F PT FALLS ASSESS-DOCD LE1/YR: CPT | Performed by: ORTHOPAEDIC SURGERY

## 2022-08-26 PROCEDURE — 20610 DRAIN/INJ JOINT/BURSA W/O US: CPT | Performed by: ORTHOPAEDIC SURGERY

## 2022-08-26 PROCEDURE — G8417 CALC BMI ABV UP PARAM F/U: HCPCS | Performed by: ORTHOPAEDIC SURGERY

## 2022-08-26 PROCEDURE — G8756 NO BP MEASURE DOC: HCPCS | Performed by: ORTHOPAEDIC SURGERY

## 2022-08-26 PROCEDURE — G8427 DOCREV CUR MEDS BY ELIG CLIN: HCPCS | Performed by: ORTHOPAEDIC SURGERY

## 2022-08-26 PROCEDURE — G8536 NO DOC ELDER MAL SCRN: HCPCS | Performed by: ORTHOPAEDIC SURGERY

## 2022-08-26 PROCEDURE — 3017F COLORECTAL CA SCREEN DOC REV: CPT | Performed by: ORTHOPAEDIC SURGERY

## 2022-08-26 RX ORDER — TRIAMCINOLONE ACETONIDE 40 MG/ML
40 INJECTION, SUSPENSION INTRA-ARTICULAR; INTRAMUSCULAR ONCE
Status: COMPLETED | OUTPATIENT
Start: 2022-08-26 | End: 2022-08-26

## 2022-08-26 RX ORDER — BUPIVACAINE HYDROCHLORIDE 2.5 MG/ML
6 INJECTION, SOLUTION INFILTRATION; PERINEURAL ONCE
Status: COMPLETED | OUTPATIENT
Start: 2022-08-26 | End: 2022-08-26

## 2022-08-26 RX ADMIN — TRIAMCINOLONE ACETONIDE 40 MG: 40 INJECTION, SUSPENSION INTRA-ARTICULAR; INTRAMUSCULAR at 09:26

## 2022-08-26 RX ADMIN — BUPIVACAINE HYDROCHLORIDE 15 MG: 2.5 INJECTION, SOLUTION INFILTRATION; PERINEURAL at 09:26

## 2022-08-26 NOTE — PROGRESS NOTES
Елена Grullon (: 1950) is a 81 y.o. male, new patient, here for evaluation of the following chief complaint(s):  Shoulder Pain       ASSESSMENT/PLAN:  Below is the assessment and plan developed based on review of pertinent history, physical exam, labs, studies, and medications. He elected to try corticosteroid injection for the right shoulder today. We did discuss future surgical treatment options if needed but we would like to hold off on this if at all possible. We discussed the risks and benefits of injection and informed consent was obtained. After a sterile preparation, 6 cc of bupivicaine and 40 mg of Kenalog were injected into the right shoulder. The patient tolerated the procedure well and there were no complications. Post injection pain, blood sugar elevation, skin discoloration, fatty atrophy and the signs of infection were discussed in detail. The patient was instructed to contact us if there were any questions or concerns prior to their follow up appointment. 1. Primary osteoarthritis, right shoulder  -     bupivacaine HCl (MARCAINE) 0.25 % (2.5 mg/mL) injection 15 mg; 15 mg (6 mL), Other, ONCE, 1 dose, On 22 at 1000  -     triamcinolone acetonide (KENALOG-40) 40 mg/mL injection 40 mg; 40 mg, Intra artICUlar, ONCE, 1 dose, On 22 at 1000      Return in about 3 months (around 2022), or if symptoms worsen or fail to improve. SUBJECTIVE/OBJECTIVE:  Елена Grullon (: 1950) is a 81 y.o. male. He notes aching pain in the right shoulder ongoing for about the last 4 months. He did have a fall off of a ladder but is unsure if that caused the onset of his pain. He notes clicking and popping. He notes stiffness with range of motion.         Allergies   Allergen Reactions    Bee Sting [Sting, Bee] Swelling     Swelling (local area of sting) - per COLT Potter in PAT    Penicillins Unknown (comments)       Current Outpatient Medications   Medication Sig rosuvastatin (CRESTOR) 20 mg tablet Take 1 Tablet by mouth nightly. aspirin (ASPIRIN) 325 mg tablet Take 1 Tablet by mouth in the morning. niacin ER (NIASPAN) 500 mg tablet Take 1 Tablet by mouth nightly. vit A/vit C/vit E/zinc/copper (PRESERVISION AREDS PO) Take 2 Tablets by mouth daily. meloxicam (MOBIC) 15 mg tablet Take 1 Tablet by mouth in the morning. simvastatin (ZOCOR) 80 mg tablet Take 1 Tablet by mouth nightly. Current Facility-Administered Medications   Medication    bupivacaine HCl (MARCAINE) 0.25 % (2.5 mg/mL) injection 15 mg    triamcinolone acetonide (KENALOG-40) 40 mg/mL injection 40 mg       Social History     Socioeconomic History    Marital status:      Spouse name: Not on file    Number of children: Not on file    Years of education: Not on file    Highest education level: Not on file   Occupational History    Not on file   Tobacco Use    Smoking status: Former     Packs/day: 1.00     Years: 45.00     Pack years: 45.00     Types: Cigarettes     Quit date: 2012     Years since quitting: 10.6    Smokeless tobacco: Never   Vaping Use    Vaping Use: Never used   Substance and Sexual Activity    Alcohol use:  Yes     Alcohol/week: 5.0 standard drinks     Types: 6 Cans of beer per week    Drug use: No    Sexual activity: Yes     Partners: Female     Birth control/protection: Surgical   Other Topics Concern    Not on file   Social History Narrative    Not on file     Social Determinants of Health     Financial Resource Strain: Not on file   Food Insecurity: Not on file   Transportation Needs: Not on file   Physical Activity: Not on file   Stress: Not on file   Social Connections: Not on file   Intimate Partner Violence: Not on file   Housing Stability: Not on file       Past Surgical History:   Procedure Laterality Date    HX GI      HX HEMORRHOIDECTOMY      VASCULAR SURGERY PROCEDURE UNLIST      left leg stent       Family History   Problem Relation Age of Onset    Diabetes Mother     Alcohol abuse Father                REVIEW OF SYSTEMS:  ROS    Positive for: Musculoskeletal  Last edited by Monserrat Franco on 8/26/2022  8:54 AM.        Patient denies any recent fever, chills, nausea, vomiting, chest pain, or shortness of breath. Vitals:  Ht 5' 8\" (1.727 m)   Wt 175 lb (79.4 kg)   BMI 26.61 kg/m²    Body mass index is 26.61 kg/m². PHYSICAL EXAM:  General exam: Patient is awake, alert, and oriented x3. Well-appearing. No acute distress. Ambulates with a normal gait. Right shoulder: Neurovascular and sensory intact. There is decreased range of motion in all planes on active and passive exam.  There is crepitus with range of motion of the shoulder. There is pain with impingement testing including Parson exam.  There is weakness noted with resisted abduction and resisted external rotation on exam.  Normal stability is noted. IMAGING:    XR Results (most recent):  Results from Hospital Encounter encounter on 07/25/22    XR SHOULDER RT AP/LAT MIN 2 V    Narrative  EXAM: XR SHOULDER RT AP/LAT MIN 2 V    INDICATION: Chronic right shoulder pain. COMPARISON: None. FINDINGS: 2 views of the right shoulder demonstrate no fracture, dislocation or  other acute abnormality. Significant degenerative changes are noted in the  glenohumeral and acromioclavicular joints. Impression  Significant degenerative changes without acute abnormality. Results from Appointment encounter on 12/22/17    XR SPINE LUMB 2 OR 3 V    Narrative  INDICATION:  Chronic right-sided low back pain with right sciatica. COMPARISON:  No old study. FINDINGS:  Three AP and lateral views were obtained of the lumbosacral spine. The vertebral bodies show satisfactory height and alignment. Small vertebral body spurs are seen. Vascular calcification and a left iliofemoral stent are noted. Impression  IMPRESSION:  Degenerative change with vertebral body spurring.     No compression fracture deformity or subluxation. XR HIP RT W OR WO PELV 2-3 VWS    Narrative  EXAM:  XR HIP RT W OR WO PELV 2-3 VWS    INDICATION:  Right hip pain    COMPARISON:  None. FINDINGS:  An AP view of the pelvis and a frogleg lateral view of the right hip  demonstrate no fracture, dislocation or bony destruction. Vascular calcification and a left iliofemoral vascular stent are noted    Impression  IMPRESSION:  No acute right hip bony abnormality         Orders Placed This Encounter    bupivacaine HCl (MARCAINE) 0.25 % (2.5 mg/mL) injection 15 mg    triamcinolone acetonide (KENALOG-40) 40 mg/mL injection 40 mg              An electronic signature was used to authenticate this note.   -- Manish Balbuena DO

## 2022-11-16 RX ORDER — NIACIN 500 MG/1
TABLET, EXTENDED RELEASE ORAL
Qty: 90 TABLET | Refills: 3 | Status: SHIPPED | OUTPATIENT
Start: 2022-11-16

## 2022-11-16 NOTE — TELEPHONE ENCOUNTER
RX refill request from the patient/pharmacy. Patient last seen 07- with labs, and next appt. scheduled for 01-  Requested Prescriptions     Pending Prescriptions Disp Refills    niacin ER (NIASPAN) 500 mg tablet [Pharmacy Med Name: NIACIN  MG Tablet Extended Release] 90 Tablet 3     Sig: TAKE 1 TABLET EVERY NIGHT    .

## 2023-01-03 ENCOUNTER — PATIENT MESSAGE (OUTPATIENT)
Dept: INTERNAL MEDICINE CLINIC | Age: 73
End: 2023-01-03

## 2023-01-03 RX ORDER — ROSUVASTATIN CALCIUM 20 MG/1
20 TABLET, COATED ORAL
Qty: 90 TABLET | Refills: 3 | Status: SHIPPED | OUTPATIENT
Start: 2023-01-03 | End: 2023-01-04 | Stop reason: SDUPTHER

## 2023-01-03 RX ORDER — NIACIN 500 MG/1
500 TABLET, EXTENDED RELEASE ORAL
Qty: 90 TABLET | Refills: 3 | Status: SHIPPED | OUTPATIENT
Start: 2023-01-03 | End: 2023-01-04 | Stop reason: SDUPTHER

## 2023-01-03 NOTE — TELEPHONE ENCOUNTER
PCP: John Davis MD    Last appt: 7/25/2022  Future Appointments   Date Time Provider Lenny Bautista   1/9/2023  8:20 AM DO CLARISA Ramos [facility] AM[facility]   1/25/2023  8:10 AM John Davis MD [facility]       Requested Prescriptions     Pending Prescriptions Disp Refills    niacin ER (NIASPAN) 500 mg tablet 90 Tablet 3     Sig: Take 1 Tablet by mouth nightly. rosuvastatin (CRESTOR) 20 mg tablet 90 Tablet 3     Sig: Take 1 Tablet by mouth nightly.          Other Comments:

## 2023-01-04 RX ORDER — NIACIN 500 MG/1
500 TABLET, EXTENDED RELEASE ORAL
Qty: 90 TABLET | Refills: 3 | Status: SHIPPED | OUTPATIENT
Start: 2023-01-04

## 2023-01-04 RX ORDER — ROSUVASTATIN CALCIUM 20 MG/1
20 TABLET, COATED ORAL
Qty: 90 TABLET | Refills: 3 | Status: SHIPPED | OUTPATIENT
Start: 2023-01-04

## 2023-01-04 NOTE — TELEPHONE ENCOUNTER
RX refill request from the patient/pharmacy. Patient last seen 07- with labs, and next appt. scheduled for 01-  Requested Prescriptions     Pending Prescriptions Disp Refills    niacin ER (NIASPAN) 500 mg tablet 90 Tablet 3     Sig: Take 1 Tablet by mouth nightly. rosuvastatin (CRESTOR) 20 mg tablet 90 Tablet 3     Sig: Take 1 Tablet by mouth nightly. Osman Peraza

## 2023-01-09 ENCOUNTER — OFFICE VISIT (OUTPATIENT)
Dept: ORTHOPEDIC SURGERY | Age: 73
End: 2023-01-09
Payer: MEDICARE

## 2023-01-09 VITALS — HEIGHT: 68 IN | WEIGHT: 175 LBS | BODY MASS INDEX: 26.52 KG/M2

## 2023-01-09 DIAGNOSIS — M19.011 PRIMARY OSTEOARTHRITIS, RIGHT SHOULDER: Primary | ICD-10-CM

## 2023-01-09 PROCEDURE — 20610 DRAIN/INJ JOINT/BURSA W/O US: CPT | Performed by: ORTHOPAEDIC SURGERY

## 2023-01-09 RX ORDER — TRIAMCINOLONE ACETONIDE 40 MG/ML
40 INJECTION, SUSPENSION INTRA-ARTICULAR; INTRAMUSCULAR ONCE
Status: COMPLETED | OUTPATIENT
Start: 2023-01-09 | End: 2023-01-09

## 2023-01-09 RX ORDER — BUPIVACAINE HYDROCHLORIDE 7.5 MG/ML
3 INJECTION, SOLUTION EPIDURAL; RETROBULBAR ONCE
Status: COMPLETED | OUTPATIENT
Start: 2023-01-09 | End: 2023-01-09

## 2023-01-09 RX ADMIN — BUPIVACAINE HYDROCHLORIDE 22.5 MG: 7.5 INJECTION, SOLUTION EPIDURAL; RETROBULBAR at 08:23

## 2023-01-09 RX ADMIN — TRIAMCINOLONE ACETONIDE 40 MG: 40 INJECTION, SUSPENSION INTRA-ARTICULAR; INTRAMUSCULAR at 08:23

## 2023-01-09 NOTE — LETTER
1/9/2023    Patient: Ajith Muir   YOB: 1950   Date of Visit: 1/9/2023     Phil[facility] Check, [address]  [id number]  Via In Basket    Dear Lisa Pagan MD,      Thank you for referring Mr. Sandhya Herman to [facility] for evaluation. My notes for this consultation are attached. If you have questions, please do not hesitate to call me. I look forward to following your patient along with you.       Sincerely,    Xiang William, DO

## 2023-01-09 NOTE — PROGRESS NOTES
Yeimi Lozano (: 1950) is a 81 y.o. male, established patient, here for evaluation of the following chief complaint(s):  Shoulder Pain       ASSESSMENT/PLAN:  Below is the assessment and plan developed based on review of pertinent history, physical exam, labs, studies, and medications. Findings were discussed with the patient today. He elected to try corticosteroid injection for the right shoulder. We did briefly discuss possibility of shoulder arthroplasty in the future. He will consider this but he continues to work and wonders how much time he can take off of work. We discussed the risks and benefits of injection and informed consent was obtained. After a sterile preparation, 3 cc of bupivicaine and 40 mg of Kenalog were injected into the right shoulder. The patient tolerated the procedure well and there were no complications. Post injection pain, blood sugar elevation, skin discoloration, fatty atrophy and the signs of infection were discussed in detail. The patient was instructed to contact us if there were any questions or concerns prior to their follow up appointment. 1. Primary osteoarthritis, right shoulder      Return in about 3 months (around 2023), or if symptoms worsen or fail to improve. SUBJECTIVE/OBJECTIVE:  Yeimi Lozano (: 1950) is a 81 y.o. male. He presents today with recurrent right shoulder pain. He denies any specific injury but continues with ice and anti-inflammatories with mild relief. Previous corticosteroid injection helped temporarily        Allergies   Allergen Reactions    Bee Sting [Sting, Bee] Swelling     Swelling (local area of sting) - per COLT Garrett in PAT    Penicillins Unknown (comments)       Current Outpatient Medications   Medication Sig    niacin ER (NIASPAN) 500 mg tablet Take 1 Tablet by mouth nightly. rosuvastatin (CRESTOR) 20 mg tablet Take 1 Tablet by mouth nightly.     aspirin (ASPIRIN) 325 mg tablet Take 1 Tablet by mouth in the morning. meloxicam (MOBIC) 15 mg tablet Take 1 Tablet by mouth in the morning. vit A/vit C/vit E/zinc/copper (PRESERVISION AREDS PO) Take 2 Tablets by mouth daily. Current Facility-Administered Medications   Medication    triamcinolone acetonide (KENALOG-40) 40 mg/mL injection 40 mg    bupivacaine (PF) (MARCAINE) 0.75 % (7.5 mg/mL) injection 22.5 mg       Social History     Socioeconomic History    Marital status:      Spouse name: Not on file    Number of children: Not on file    Years of education: Not on file    Highest education level: Not on file   Occupational History    Not on file   Tobacco Use    Smoking status: Former     Packs/day: 1.00     Years: 45.00     Pack years: 45.00     Types: Cigarettes     Quit date:      Years since quittin.0    Smokeless tobacco: Never   Vaping Use    Vaping Use: Never used   Substance and Sexual Activity    Alcohol use:  Yes     Alcohol/week: 5.0 standard drinks     Types: 6 Cans of beer per week    Drug use: No    Sexual activity: Yes     Partners: Female     Birth control/protection: Surgical   Other Topics Concern    Not on file   Social History Narrative    Not on file     Social Determinants of Health     Financial Resource Strain: Not on file   Food Insecurity: Not on file   Transportation Needs: Not on file   Physical Activity: Not on file   Stress: Not on file   Social Connections: Not on file   Intimate Partner Violence: Not on file   Housing Stability: Not on file       Past Surgical History:   Procedure Laterality Date    HX GI      HX HEMORRHOIDECTOMY      CT UNLISTED PROCEDURE VASCULAR SURGERY      left leg stent       Family History   Problem Relation Age of Onset    Diabetes Mother     Alcohol abuse Father                REVIEW OF SYSTEMS:  ROS    Positive for: Musculoskeletal  Last edited by Diamante Marin on 2023  8:12 AM.        Patient denies any recent fever, chills, nausea, vomiting, chest pain, or shortness of breath. Vitals:  Ht 5' 8\" (1.727 m)   Wt 175 lb (79.4 kg)   BMI 26.61 kg/m²    Body mass index is 26.61 kg/m². PHYSICAL EXAM:  General exam: Patient is awake, alert, and oriented x3. Well-appearing. No acute distress. Ambulates with a normal gait. Heart/Lungs:  no respiratory distress, palpable pulses    Right shoulder: Neurovascular and sensory intact. There is decreased range of motion in all planes on active and passive exam.  There is crepitus with range of motion of the shoulder. There is pain with impingement testing including Parson exam.  There is weakness noted with resisted abduction and resisted external rotation on exam.  Normal stability is noted. IMAGING:    XR Results (most recent):  Results from Hospital Encounter encounter on 07/25/22    XR SHOULDER RT AP/LAT MIN 2 V    Narrative  EXAM: XR SHOULDER RT AP/LAT MIN 2 V    INDICATION: Chronic right shoulder pain. COMPARISON: None. FINDINGS: 2 views of the right shoulder demonstrate no fracture, dislocation or  other acute abnormality. Significant degenerative changes are noted in the  glenohumeral and acromioclavicular joints. Impression  Significant degenerative changes without acute abnormality. Results from Appointment encounter on 12/22/17    XR SPINE LUMB 2 OR 3 V    Narrative  INDICATION:  Chronic right-sided low back pain with right sciatica. COMPARISON:  No old study. FINDINGS:  Three AP and lateral views were obtained of the lumbosacral spine. The vertebral bodies show satisfactory height and alignment. Small vertebral body spurs are seen. Vascular calcification and a left iliofemoral stent are noted. Impression  IMPRESSION:  Degenerative change with vertebral body spurring. No compression fracture deformity or subluxation. XR HIP RT W OR WO PELV 2-3 VWS    Narrative  EXAM:  XR HIP RT W OR WO PELV 2-3 VWS    INDICATION:  Right hip pain    COMPARISON:  None.     FINDINGS:  An AP view of the pelvis and a frogleg lateral view of the right hip  demonstrate no fracture, dislocation or bony destruction. Vascular calcification and a left iliofemoral vascular stent are noted    Impression  IMPRESSION:  No acute right hip bony abnormality         Orders Placed This Encounter    triamcinolone acetonide (KENALOG-40) 40 mg/mL injection 40 mg    bupivacaine (PF) (MARCAINE) 0.75 % (7.5 mg/mL) injection 22.5 mg              An electronic signature was used to authenticate this note.   -- Marcio Sanders, DO

## 2023-01-25 ENCOUNTER — TELEPHONE (OUTPATIENT)
Dept: INTERNAL MEDICINE CLINIC | Age: 73
End: 2023-01-25

## 2023-01-25 NOTE — TELEPHONE ENCOUNTER
[facility] Dr. Malini Barragan got the report from the office and wanted to make sure patient was progressing well and see if he desired paxlovid. Patient states he is doing well and does not need the treatment.

## 2023-02-01 PROBLEM — Z00.00 MEDICARE ANNUAL WELLNESS VISIT, SUBSEQUENT: Status: ACTIVE | Noted: 2022-01-21

## 2023-02-01 NOTE — PROGRESS NOTES
This is a Subsequent Medicare Annual Wellness Visit providing Personalized Prevention Plan Services (PPPS) (Performed 12 months after initial AWV and PPPS )    I have reviewed the patient's medical history in detail and updated the computerized patient record. He presents today for his Medicare subsequent annual wellness examination and screening questionnaire. He is also in follow-up of his multiple medical problems include hypertension, glucose intolerance, hyperlipidemia, ASVD, prostate carcinoma status post treatment, DJD, history of tobacco abuse now without smoking for about 10 years, vitamin D deficiency and other multiple medical problems. He is taking his medication trying to follow his diet remains physically active. Currently he denies any chest pain, shortness of breath, palpitations, PND, orthopnea or other cardiac or respiratory complaints. He notes no current GI or  complaints. He notes no headaches, dizziness or neurologic complaints. He does have some arthritic complaints in his shoulders that have not changed but no other complaints regarding arthritis and no other complaints on complete review of systems.     History     Past Medical History:   Diagnosis Date    Arthritis of multiple sites 12/21/2017    ASVD (arteriosclerotic vascular disease) 12/21/2017    Cancer ([address]     prostate    COVID 01/20/2023    DJD (degenerative joint disease) 12/21/2017    ED (erectile dysfunction) 12/21/2017    Glucose intolerance (impaired glucose tolerance) 12/21/2017    History of tobacco abuse 12/21/2017    HTN (hypertension) 12/21/2017    Hypercholesterolemia     Hyperlipidemia 12/21/2017    On long term drug therapy 12/21/2017    PAD (peripheral artery disease) ([address]     left leg    Prostate carcinoma ([address] 12/21/2017      Past Surgical History:   Procedure Laterality Date    HX GI      HX HEMORRHOIDECTOMY      RI UNLISTED PROCEDURE VASCULAR SURGERY      left leg stent     Social History     Tobacco Use    Smoking status: Former     Packs/day: 1.00     Years: 45.00     Pack years: 45.00     Types: Cigarettes     Quit date:      Years since quittin.0    Smokeless tobacco: Never   Vaping Use    Vaping Use: Never used   Substance Use Topics    Alcohol use: Yes     Alcohol/week: 5.0 standard drinks     Types: 6 Cans of beer per week    Drug use: No     Current Outpatient Medications   Medication Sig Dispense Refill    omega 3-dha-epa-fish oil (Fish OiL) 100-160-1,000 mg cap Take  by mouth. niacin ER (NIASPAN) 500 mg tablet Take 1 Tablet by mouth nightly. 90 Tablet 3    rosuvastatin (CRESTOR) 20 mg tablet Take 1 Tablet by mouth nightly. 90 Tablet 3    aspirin (ASPIRIN) 325 mg tablet Take 1 Tablet by mouth in the morning. 30 Tablet PRN    vit A/vit C/vit E/zinc/copper (PRESERVISION AREDS PO) Take 2 Tablets by mouth daily. Allergies   Allergen Reactions    Bee Sting [Sting, Bee] Swelling     Swelling (local area of sting) - per RN Antonina Dies in PAT    Penicillins Unknown (comments)     Family History   Problem Relation Age of Onset    Diabetes Mother     Alcohol abuse Father        Patient Active Problem List    Diagnosis    Primary osteoarthritis involving multiple joints    Essential hypertension    Glucose intolerance (impaired glucose tolerance)    Mixed hyperlipidemia    Chronic right shoulder pain    Allergic dermatitis    Medicare annual wellness visit, subsequent    Vitamin D deficiency    Neoplasm of uncertain behavior of skin    Ptosis of eyelid, bilateral    Alcohol screening    ED (erectile dysfunction)    History of tobacco abuse    Prostate carcinoma ([facility]    ASVD (arteriosclerotic vascular disease)     CTA neck 2020  1. Mild atherosclerotic calcification carotid bifurcations without significant  stenosis. 2. Atherosclerotic calcification aortic arch.  Minimal/mild stenosis origin left  common carotid artery         Patient Care Team:  Mirlande Ying MD as PCP - General (Internal Medicine Physician)  Eliazar Bumpers, MD as PCP - [facility] Empaneled Provider    Depression Risk Factor Screening:     3 most recent PHQ Screens 2/2/2023   Little interest or pleasure in doing things Not at all   Feeling down, depressed, irritable, or hopeless Not at all   Total Score PHQ 2 0     Alcohol Risk Factor Screening: You do not drink alcohol or very rarely. Functional Ability and Level of Safety:     Fall Risk     Fall Risk Assessment, last 12 mths 2/2/2023   Able to walk? Yes   Fall in past 12 months? 0   Do you feel unsteady? 0   Are you worried about falling 0       Hearing Loss   mild    Activities of Daily Living   Self-care. ADL Assessment 2/2/2023   Feeding yourself No Help Needed   Getting from bed to chair No Help Needed   Getting dressed No Help Needed   Bathing or showering No Help Needed   Walk across the room (includes cane/walker) No Help Needed   Using the telphone No Help Needed   Taking your medications No Help Needed   Preparing meals No Help Needed   Managing money (expenses/bills) No Help Needed   Moderately strenuous housework (laundry) No Help Needed   Shopping for personal items (toiletries/medicines) No Help Needed   Shopping for groceries No Help Needed   Driving No Help Needed   Climbing a flight of stairs No Help Needed   Getting to places beyond walking distances No Help Needed       Abuse Screen   Patient is not abused    Social History     Social History Narrative    Not on file       Review of Systems      ROS:    Constitutional: He denies fevers, weight loss, sweats. Eyes: No blurred or double vision. ENT: No difficulty with swallowing, taste, speech or smell. Neck: no stiffness or swelling  Respiratory: No cough wheezing or shortness of breath. Cardiovascular: Denies chest pain, palpitations, unexplained indigestion or syncope. Gastrointestinal:  No changes in bowel movements, no abdominal pain, no bloating.   Genitourinary:  He denies frequency, nocturia or stranguria. Extremities: No joint pain, stiffness or swelling. Neurological:  No numbness, tingling, burring paresthesias or loss of motor strength. No syncope, dizziness or frequent headache  Lymphatic: no adenopathy noted  Hematologic: no easy bruising or bleeding gums  Skin:  No recent rashes or mole changes. Psychiatric/Behavioral:  Negative for depression. Physical Examination     Evaluation of Cognitive Function:  Mood/affect:  happy  Appearance: age appropriate  Family member/caregiver input: None    Vitals:    02/02/23 0916 02/02/23 0941   BP: (!) 140/84 138/82   Pulse: 62    Resp: 16    Temp: 98.5 °F (36.9 °C)    TempSrc: Oral    SpO2: 96%    Weight: 179 lb (81.2 kg)    Height: 5' 8\" (1.727 m)    PainSc:   0 - No pain         PHYSICAL EXAM:    General appearance - alert, well appearing, and in no distress  Mental status - alert, oriented to person, place, and time  HEENT:  Ears - bilateral TM's and external ear canals clear  Eyes - pupillary responses were normal.  Extraocular muscle function intact. Lids and conjunctiva not injected. Fundoscopic exam revealed sharp disc margins. eye movements intact  Pharynx- clear with teeth in good repair. No masses were noted  Neck - supple without thyromegaly or burit. No JVD noted  Lungs - clear to auscultation and percussion  Cardiac- normal rate, regular rhythm without murmurs. PMI not displaced. No gallop, rub or click  Abdomen - flat, soft, non-tender without palpable organomegaly or mass. No pulsatile mass was felt, and not bruit was heard.   Bowel sounds were active  : Circumcised, Testes descended w/o masses  Rectal: normal sphincter tone, prostate 1+ enlarged, smooth and nontender without nodules, no masses, stool brown and hemacult negative  Extremities -  no clubbing cyanosis or edema  Lymphatics - no palpable lymphadenopathy, no hepatosplenomegaly  Hematologic: no petechiae or purpura  Peripheral vascular -Femoral, Dorsalis pedis and posterior tibial pulses felt without difficulty  Skin - no rash or unusual mole change noted  Neurological - Cranial nerves II-XII grossly intact. Motor strength 5/5. DTR's 2+ and symmetric. Station and gait normal  Back exam - full range of motion, no tenderness, palpable spasm or pain on motion  Musculoskeletal - no joint tenderness, deformity or swelling       Results for orders placed or performed in visit on 07/25/22   HEMOGLOBIN A1C WITH EAG   Result Value Ref Range    Hemoglobin A1c 6.4 (H) 4.0 - 5.6 %    Est. average glucose 137 mg/dL   CK   Result Value Ref Range     39 - 945 U/L   METABOLIC PANEL, COMPREHENSIVE   Result Value Ref Range    Sodium 137 136 - 145 mmol/L    Potassium 4.5 3.5 - 5.1 mmol/L    Chloride 104 97 - 108 mmol/L    CO2 26 21 - 32 mmol/L    Anion gap 7 5 - 15 mmol/L    Glucose 116 (H) 65 - 100 mg/dL    BUN 19 6 - 20 MG/DL    Creatinine 0.78 0.70 - 1.30 MG/DL    BUN/Creatinine ratio 24 (H) 12 - 20      GFR est AA >60 >60 ml/min/1.73m2    GFR est non-AA >60 >60 ml/min/1.73m2    Calcium 9.2 8.5 - 10.1 MG/DL    Bilirubin, total 0.5 0.2 - 1.0 MG/DL    ALT (SGPT) 57 12 - 78 U/L    AST (SGOT) 33 15 - 37 U/L    Alk. phosphatase 84 45 - 117 U/L    Protein, total 7.3 6.4 - 8.2 g/dL    Albumin 3.8 3.5 - 5.0 g/dL    Globulin 3.5 2.0 - 4.0 g/dL    A-G Ratio 1.1 1.1 - 2.2     LIPID PANEL   Result Value Ref Range    Cholesterol, total 159 <200 MG/DL    Triglyceride 137 <150 MG/DL    HDL Cholesterol 38 MG/DL    LDL, calculated 93.6 0 - 100 MG/DL    VLDL, calculated 27.4 MG/DL    CHOL/HDL Ratio 4.2 0.0 - 5.0         Advice/Referrals/Counseling   Education and counseling provided:  Are appropriate based on today's review and evaluation  End-of-Life planning (with patient's consent)  Pneumococcal Vaccine  Influenza Vaccine  Colorectal cancer screening tests      Assessment/Plan     ASSESSMENT:   1. Essential hypertension    2. Glucose intolerance (impaired glucose tolerance)    3.  Mixed hyperlipidemia    4. Primary osteoarthritis involving multiple joints    5. ASVD (arteriosclerotic vascular disease)    6. History of tobacco abuse    7. Prostate carcinoma ([address]    8. Vitamin D deficiency    9. Alcohol screening    10. Medicare annual wellness visit, subsequent      Impression  1. Hypertension that is controlled so continue current therapy reviewed with him. 2.  Glucose intolerance repeat status pending prior lab reviewed  3. Hyperlipidemia prior lab reviewed repeat status pending  4. DJD that is chronic but stable   5 ASVD stable  6. History of tobacco abuse now tobacco free for about 10 years  7. Prostate carcinoma status posttreatment no evidence recurrence PSA pending  8 vitamin D deficiency repeat status pending  9. Annual alcohol screening is done he claims to drink maybe 2 beers per week. We did spend 8 minutes discussing excessive alcohol intake in males who averaged more than 2 drinks per day with increased cardiovascular risk and increased risk of liver disease and other GI problems. Medicare subsequent annual wellness examination screening questionnaire was completed today. The results reviewed with him and his questions were answered. Lifestyle recommendations modifications discussed and made. Follow-up 3 months or sooner should to be a problem. I will call with lab results in the interim. PLAN:  .  Orders Placed This Encounter    CBC WITH AUTOMATED DIFF    METABOLIC PANEL, COMPREHENSIVE    LIPID PANEL    CK    HEMOGLOBIN A1C WITH EAG    T4 (THYROXINE)    TSH 3RD GENERATION    URINALYSIS W/ REFLEX CULTURE    VITAMIN D, 25 HYDROXY    PSA SCREENING (SCREENING)    omega 3-dha-epa-fish oil (Fish OiL) 100-160-1,000 mg cap         ATTENTION:   This medical record was transcribed using an electronic medical records system. Although proofread, it may and can contain electronic and spelling errors. Other human spelling and other errors may be present.   Corrections may be executed at a later time. Please feel free to contact us for any clarifications as needed. Sonjia Cooks, MD       Recommended healthy diet low in carbohydrates, fats, sodium and cholesterol. Recommended regular cardiovascular exercise 3-6 times per week for 30-60 minutes daily. Current Outpatient Medications   Medication Sig Dispense Refill    omega 3-dha-epa-fish oil (Fish OiL) 100-160-1,000 mg cap Take  by mouth. niacin ER (NIASPAN) 500 mg tablet Take 1 Tablet by mouth nightly. 90 Tablet 3    rosuvastatin (CRESTOR) 20 mg tablet Take 1 Tablet by mouth nightly. 90 Tablet 3    aspirin (ASPIRIN) 325 mg tablet Take 1 Tablet by mouth in the morning. 30 Tablet PRN    vit A/vit C/vit E/zinc/copper (PRESERVISION AREDS PO) Take 2 Tablets by mouth daily. No results found for any visits on 02/02/23. Verbal and written instructions (see AVS) provided. Patient expresses understanding of diagnosis and treatment plan.     Sonjia Cooks, MD

## 2023-02-02 ENCOUNTER — OFFICE VISIT (OUTPATIENT)
Dept: INTERNAL MEDICINE CLINIC | Age: 73
End: 2023-02-02
Payer: MEDICARE

## 2023-02-02 VITALS
TEMPERATURE: 98.5 F | HEIGHT: 68 IN | SYSTOLIC BLOOD PRESSURE: 138 MMHG | RESPIRATION RATE: 16 BRPM | DIASTOLIC BLOOD PRESSURE: 82 MMHG | BODY MASS INDEX: 27.13 KG/M2 | WEIGHT: 179 LBS | HEART RATE: 62 BPM | OXYGEN SATURATION: 96 %

## 2023-02-02 DIAGNOSIS — E78.2 MIXED HYPERLIPIDEMIA: ICD-10-CM

## 2023-02-02 DIAGNOSIS — Z87.891 HISTORY OF TOBACCO ABUSE: ICD-10-CM

## 2023-02-02 DIAGNOSIS — C61 PROSTATE CARCINOMA (HCC): ICD-10-CM

## 2023-02-02 DIAGNOSIS — M15.9 PRIMARY OSTEOARTHRITIS INVOLVING MULTIPLE JOINTS: ICD-10-CM

## 2023-02-02 DIAGNOSIS — Z13.39 ALCOHOL SCREENING: ICD-10-CM

## 2023-02-02 DIAGNOSIS — E55.9 VITAMIN D DEFICIENCY: ICD-10-CM

## 2023-02-02 DIAGNOSIS — I70.90 ASVD (ARTERIOSCLEROTIC VASCULAR DISEASE): ICD-10-CM

## 2023-02-02 DIAGNOSIS — R73.02 GLUCOSE INTOLERANCE (IMPAIRED GLUCOSE TOLERANCE): ICD-10-CM

## 2023-02-02 DIAGNOSIS — I10 ESSENTIAL HYPERTENSION: Primary | ICD-10-CM

## 2023-02-02 DIAGNOSIS — Z00.00 MEDICARE ANNUAL WELLNESS VISIT, SUBSEQUENT: ICD-10-CM

## 2023-02-02 PROCEDURE — G8536 NO DOC ELDER MAL SCRN: HCPCS | Performed by: INTERNAL MEDICINE

## 2023-02-02 PROCEDURE — 99214 OFFICE O/P EST MOD 30 MIN: CPT | Performed by: INTERNAL MEDICINE

## 2023-02-02 PROCEDURE — 3017F COLORECTAL CA SCREEN DOC REV: CPT | Performed by: INTERNAL MEDICINE

## 2023-02-02 PROCEDURE — G8427 DOCREV CUR MEDS BY ELIG CLIN: HCPCS | Performed by: INTERNAL MEDICINE

## 2023-02-02 PROCEDURE — 1101F PT FALLS ASSESS-DOCD LE1/YR: CPT | Performed by: INTERNAL MEDICINE

## 2023-02-02 PROCEDURE — G0439 PPPS, SUBSEQ VISIT: HCPCS | Performed by: INTERNAL MEDICINE

## 2023-02-02 PROCEDURE — G8510 SCR DEP NEG, NO PLAN REQD: HCPCS | Performed by: INTERNAL MEDICINE

## 2023-02-02 PROCEDURE — G0442 ANNUAL ALCOHOL SCREEN 15 MIN: HCPCS | Performed by: INTERNAL MEDICINE

## 2023-02-02 PROCEDURE — G8417 CALC BMI ABV UP PARAM F/U: HCPCS | Performed by: INTERNAL MEDICINE

## 2023-02-02 PROCEDURE — 3079F DIAST BP 80-89 MM HG: CPT | Performed by: INTERNAL MEDICINE

## 2023-02-02 PROCEDURE — 1123F ACP DISCUSS/DSCN MKR DOCD: CPT | Performed by: INTERNAL MEDICINE

## 2023-02-02 PROCEDURE — 3075F SYST BP GE 130 - 139MM HG: CPT | Performed by: INTERNAL MEDICINE

## 2023-02-02 RX ORDER — CHOLECALCIFEROL (VITAMIN D3) 50 MCG
CAPSULE ORAL
COMMUNITY

## 2023-02-02 NOTE — PROGRESS NOTES
Chief Complaint   Patient presents with    Annual Wellness Visit     Visit Vitals  BP (!) 140/84 (BP 1 Location: Left upper arm, BP Patient Position: Sitting, BP Cuff Size: Adult)   Pulse 62   Temp 98.5 °F (36.9 °C) (Oral)   Resp 16   Ht 5' 8\" (1.727 m)   Wt 179 lb (81.2 kg)   SpO2 96%   BMI 27.22 kg/m²       Depression Risk Factor Screening:     3 most recent PHQ Screens 2/2/2023   Little interest or pleasure in doing things Not at all   Feeling down, depressed, irritable, or hopeless Not at all   Total Score PHQ 2 0       Functional Ability and Level of Safety:     Activities of Daily Living  ADL Assessment 2/2/2023   Feeding yourself No Help Needed   Getting from bed to chair No Help Needed   Getting dressed No Help Needed   Bathing or showering No Help Needed   Walk across the room (includes cane/walker) No Help Needed   Using the telphone No Help Needed   Taking your medications No Help Needed   Preparing meals No Help Needed   Managing money (expenses/bills) No Help Needed   Moderately strenuous housework (laundry) No Help Needed   Shopping for personal items (toiletries/medicines) No Help Needed   Shopping for groceries No Help Needed   Driving No Help Needed   Climbing a flight of stairs No Help Needed   Getting to places beyond walking distances No Help Needed       Fall Risk  Fall Risk Assessment, last 12 mths 2/2/2023   Able to walk? Yes   Fall in past 12 months? 0   Do you feel unsteady? 0   Are you worried about falling 0       Abuse Screen  Abuse Screening Questionnaire 2/2/2023   Do you ever feel afraid of your partner? N   Are you in a relationship with someone who physically or mentally threatens you? N   Is it safe for you to go home?  Y         Patient Care Team   Patient Care Team:  Luis Mayberry MD as PCP - General (Internal Medicine Physician)  Luis Mayberry MD as PCP - [facility] Empaneled Provider

## 2023-02-02 NOTE — PATIENT INSTRUCTIONS
Medicare Wellness Visit, Male    The best way to live healthy is to have a lifestyle where you eat a well-balanced diet, exercise regularly, limit alcohol use, and quit all forms of tobacco/nicotine, if applicable. Regular preventive services are another way to keep healthy. Preventive services (vaccines, screening tests, monitoring & exams) can help personalize your care plan, which helps you manage your own care. Screening tests can find health problems at the earliest stages, when they are easiest to treat. [facility] follows the current, evidence-based guidelines published by the [facility] ([facility]) when recommending preventive services for our patients. Because we follow these guidelines, sometimes recommendations change over time as research supports it. (For example, a prostate screening blood test is no longer routinely recommended for men with no symptoms). Of course, you and your doctor may decide to screen more often for some diseases, based on your risk and co-morbidities (chronic disease you are already diagnosed with). Preventive services for you include:  - Medicare offers their members a free annual wellness visit, which is time for you and your primary care provider to discuss and plan for your preventive service needs.  Take advantage of this benefit every year!    -Over the age of 72 should receive the recommended pneumonia vaccines.   -All adults should have a flu vaccine yearly.  -All adults should receive a tetanus vaccine every 10 years.  -Over the age of 48 should receive the shingles vaccines.    -All adults should be screened once for Hepatitis C.  -All adults age 38-68 who are overweight should have a diabetes screening test once every three years.   -Other screening tests & preventive services for persons with diabetes include: an eye exam to screen for diabetic retinopathy, a kidney function test, a foot exam, and stricter control over your cholesterol.   -Cardiovascular screening for adults with routine risk involves an electrocardiogram (ECG) at intervals determined by the provider.     -Colorectal cancer screening should be done for adults age 43-69 with no increased risk factors for colorectal cancer. There are a number of acceptable methods of screening for this type of cancer. Each test has its own benefits and drawbacks. Discuss with your provider what is most appropriate for you during your annual wellness visit. The different tests include: colonoscopy (considered the best screening method), a fecal occult blood test, a fecal DNA test, and sigmoidoscopy.    -Lung cancer screening is recommended annually with a low dose CT scan for adults between age 54 and 68, who have smoked at least 30 pack years (equivalent of 1 pack per day for 30 days), and who is a current smoker or quit less than 15 years ago. -An Abdominal Aortic Aneurysm (AAA) Screening is recommended for men age 73-68 who has ever smoked in their lifetime.      Here is a list of your current Health Maintenance items (your personalized list of preventive services) with a due date:  Health Maintenance Due   Topic Date Due    Smoker or Former Smoker - [address]  Never done    AAA Screening  Never done    COVID-19 Vaccine (4 - Booster for Waneta Dunnings series) 12/18/2021    Yearly Flu Vaccine (1) 08/01/2022    Annual Well Visit  01/25/2023

## 2023-02-03 LAB
25(OH)D3 SERPL-MCNC: 25.7 NG/ML (ref 30–100)
ALBUMIN SERPL-MCNC: 4.2 G/DL (ref 3.5–5)
ALBUMIN/GLOB SERPL: 1.2 (ref 1.1–2.2)
ALP SERPL-CCNC: 81 U/L (ref 45–117)
ALT SERPL-CCNC: 94 U/L (ref 12–78)
ANION GAP SERPL CALC-SCNC: 6 MMOL/L (ref 5–15)
APPEARANCE UR: CLEAR
AST SERPL-CCNC: 33 U/L (ref 15–37)
BACTERIA URNS QL MICRO: NEGATIVE /HPF
BASOPHILS # BLD: 0 K/UL (ref 0–0.1)
BASOPHILS NFR BLD: 0 % (ref 0–1)
BILIRUB SERPL-MCNC: 0.7 MG/DL (ref 0.2–1)
BILIRUB UR QL: NEGATIVE
BUN SERPL-MCNC: 14 MG/DL (ref 6–20)
BUN/CREAT SERPL: 18 (ref 12–20)
CALCIUM SERPL-MCNC: 9.3 MG/DL (ref 8.5–10.1)
CHLORIDE SERPL-SCNC: 107 MMOL/L (ref 97–108)
CHOLEST SERPL-MCNC: 139 MG/DL
CK SERPL-CCNC: 76 U/L (ref 39–308)
CO2 SERPL-SCNC: 26 MMOL/L (ref 21–32)
COLOR UR: NORMAL
CREAT SERPL-MCNC: 0.78 MG/DL (ref 0.7–1.3)
DIFFERENTIAL METHOD BLD: NORMAL
EOSINOPHIL # BLD: 0.2 K/UL (ref 0–0.4)
EOSINOPHIL NFR BLD: 2 % (ref 0–7)
EPITH CASTS URNS QL MICRO: NORMAL /LPF
ERYTHROCYTE [DISTWIDTH] IN BLOOD BY AUTOMATED COUNT: 13.9 % (ref 11.5–14.5)
EST. AVERAGE GLUCOSE BLD GHB EST-MCNC: 131 MG/DL
GLOBULIN SER CALC-MCNC: 3.4 G/DL (ref 2–4)
GLUCOSE SERPL-MCNC: 99 MG/DL (ref 65–100)
GLUCOSE UR STRIP.AUTO-MCNC: NEGATIVE MG/DL
HBA1C MFR BLD: 6.2 % (ref 4–5.6)
HCT VFR BLD AUTO: 47.2 % (ref 36.6–50.3)
HDLC SERPL-MCNC: 41 MG/DL
HDLC SERPL: 3.4 (ref 0–5)
HGB BLD-MCNC: 15 G/DL (ref 12.1–17)
HGB UR QL STRIP: NEGATIVE
HYALINE CASTS URNS QL MICRO: NORMAL /LPF (ref 0–5)
IMM GRANULOCYTES # BLD AUTO: 0 K/UL (ref 0–0.04)
IMM GRANULOCYTES NFR BLD AUTO: 0 % (ref 0–0.5)
KETONES UR QL STRIP.AUTO: NEGATIVE MG/DL
LDLC SERPL CALC-MCNC: 79.2 MG/DL (ref 0–100)
LEUKOCYTE ESTERASE UR QL STRIP.AUTO: NEGATIVE
LYMPHOCYTES # BLD: 2.4 K/UL (ref 0.8–3.5)
LYMPHOCYTES NFR BLD: 24 % (ref 12–49)
MCH RBC QN AUTO: 29.2 PG (ref 26–34)
MCHC RBC AUTO-ENTMCNC: 31.8 G/DL (ref 30–36.5)
MCV RBC AUTO: 92 FL (ref 80–99)
MONOCYTES # BLD: 0.6 K/UL (ref 0–1)
MONOCYTES NFR BLD: 6 % (ref 5–13)
NEUTS SEG # BLD: 6.6 K/UL (ref 1.8–8)
NEUTS SEG NFR BLD: 68 % (ref 32–75)
NITRITE UR QL STRIP.AUTO: NEGATIVE
NRBC # BLD: 0 K/UL (ref 0–0.01)
NRBC BLD-RTO: 0 PER 100 WBC
PH UR STRIP: 6 (ref 5–8)
PLATELET # BLD AUTO: 278 K/UL (ref 150–400)
PMV BLD AUTO: 9.2 FL (ref 8.9–12.9)
POTASSIUM SERPL-SCNC: 4.5 MMOL/L (ref 3.5–5.1)
PROT SERPL-MCNC: 7.6 G/DL (ref 6.4–8.2)
PROT UR STRIP-MCNC: NEGATIVE MG/DL
PSA SERPL-MCNC: 0 NG/ML (ref 0.01–4)
RBC # BLD AUTO: 5.13 M/UL (ref 4.1–5.7)
RBC #/AREA URNS HPF: NORMAL /HPF (ref 0–5)
RBC MORPH BLD: NORMAL
SODIUM SERPL-SCNC: 139 MMOL/L (ref 136–145)
SP GR UR REFRACTOMETRY: 1.01 (ref 1–1.03)
T4 SERPL-MCNC: 9.4 UG/DL (ref 4.5–12.1)
TRIGL SERPL-MCNC: 94 MG/DL (ref ?–150)
TSH SERPL DL<=0.05 MIU/L-ACNC: 3.09 UIU/ML (ref 0.36–3.74)
UA: UC IF INDICATED,UAUC: NORMAL
UROBILINOGEN UR QL STRIP.AUTO: 0.2 EU/DL (ref 0.2–1)
VLDLC SERPL CALC-MCNC: 18.8 MG/DL
WBC # BLD AUTO: 9.8 K/UL (ref 4.1–11.1)
WBC URNS QL MICRO: NORMAL /HPF (ref 0–4)

## 2023-03-03 PROBLEM — Z00.00 MEDICARE ANNUAL WELLNESS VISIT, SUBSEQUENT: Status: RESOLVED | Noted: 2022-01-21 | Resolved: 2023-03-03

## 2023-05-17 SDOH — ECONOMIC STABILITY: HOUSING INSECURITY
IN THE LAST 12 MONTHS, WAS THERE A TIME WHEN YOU DID NOT HAVE A STEADY PLACE TO SLEEP OR SLEPT IN A SHELTER (INCLUDING NOW)?: PATIENT REFUSED

## 2023-05-17 SDOH — ECONOMIC STABILITY: TRANSPORTATION INSECURITY
IN THE PAST 12 MONTHS, HAS LACK OF TRANSPORTATION KEPT YOU FROM MEETINGS, WORK, OR FROM GETTING THINGS NEEDED FOR DAILY LIVING?: PATIENT DECLINED

## 2023-05-17 SDOH — ECONOMIC STABILITY: INCOME INSECURITY: HOW HARD IS IT FOR YOU TO PAY FOR THE VERY BASICS LIKE FOOD, HOUSING, MEDICAL CARE, AND HEATING?: PATIENT DECLINED

## 2023-05-17 SDOH — ECONOMIC STABILITY: FOOD INSECURITY: WITHIN THE PAST 12 MONTHS, YOU WORRIED THAT YOUR FOOD WOULD RUN OUT BEFORE YOU GOT MONEY TO BUY MORE.: PATIENT DECLINED

## 2023-05-17 SDOH — ECONOMIC STABILITY: FOOD INSECURITY: WITHIN THE PAST 12 MONTHS, THE FOOD YOU BOUGHT JUST DIDN'T LAST AND YOU DIDN'T HAVE MONEY TO GET MORE.: PATIENT DECLINED

## 2023-05-18 ENCOUNTER — OFFICE VISIT (OUTPATIENT)
Facility: CLINIC | Age: 73
End: 2023-05-18
Payer: MEDICARE

## 2023-05-18 VITALS
SYSTOLIC BLOOD PRESSURE: 132 MMHG | TEMPERATURE: 97.7 F | WEIGHT: 179.7 LBS | OXYGEN SATURATION: 98 % | DIASTOLIC BLOOD PRESSURE: 88 MMHG | RESPIRATION RATE: 16 BRPM | BODY MASS INDEX: 27.23 KG/M2 | HEART RATE: 60 BPM | HEIGHT: 68 IN

## 2023-05-18 DIAGNOSIS — S39.012A BACK STRAIN, INITIAL ENCOUNTER: ICD-10-CM

## 2023-05-18 PROCEDURE — 1123F ACP DISCUSS/DSCN MKR DOCD: CPT | Performed by: INTERNAL MEDICINE

## 2023-05-18 PROCEDURE — 3017F COLORECTAL CA SCREEN DOC REV: CPT | Performed by: INTERNAL MEDICINE

## 2023-05-18 PROCEDURE — G8419 CALC BMI OUT NRM PARAM NOF/U: HCPCS | Performed by: INTERNAL MEDICINE

## 2023-05-18 PROCEDURE — 3075F SYST BP GE 130 - 139MM HG: CPT | Performed by: INTERNAL MEDICINE

## 2023-05-18 PROCEDURE — G8427 DOCREV CUR MEDS BY ELIG CLIN: HCPCS | Performed by: INTERNAL MEDICINE

## 2023-05-18 PROCEDURE — 3079F DIAST BP 80-89 MM HG: CPT | Performed by: INTERNAL MEDICINE

## 2023-05-18 PROCEDURE — 1036F TOBACCO NON-USER: CPT | Performed by: INTERNAL MEDICINE

## 2023-05-18 PROCEDURE — 99213 OFFICE O/P EST LOW 20 MIN: CPT | Performed by: INTERNAL MEDICINE

## 2023-05-18 RX ORDER — PREDNISONE 1 MG/1
TABLET ORAL
Qty: 48 TABLET | Refills: 0 | Status: SHIPPED | OUTPATIENT
Start: 2023-05-18

## 2023-05-18 RX ORDER — METHOCARBAMOL 750 MG/1
750 TABLET, FILM COATED ORAL 4 TIMES DAILY
Qty: 40 TABLET | Refills: 0 | Status: SHIPPED | OUTPATIENT
Start: 2023-05-18 | End: 2023-05-28

## 2023-05-18 RX ORDER — AMOXICILLIN 500 MG
CAPSULE ORAL
COMMUNITY

## 2023-05-18 ASSESSMENT — PATIENT HEALTH QUESTIONNAIRE - PHQ9
SUM OF ALL RESPONSES TO PHQ QUESTIONS 1-9: 0
1. LITTLE INTEREST OR PLEASURE IN DOING THINGS: 0
SUM OF ALL RESPONSES TO PHQ9 QUESTIONS 1 & 2: 0
SUM OF ALL RESPONSES TO PHQ QUESTIONS 1-9: 0
2. FEELING DOWN, DEPRESSED OR HOPELESS: 0
SUM OF ALL RESPONSES TO PHQ QUESTIONS 1-9: 0
SUM OF ALL RESPONSES TO PHQ QUESTIONS 1-9: 0

## 2023-05-18 NOTE — PROGRESS NOTES
Chief Complaint   Patient presents with    Back Pain     Back/neck pain left side/ back pain X5 days     1. Have you been to the ER, urgent care clinic since your last visit? Hospitalized since your last visit? No    2. Have you seen or consulted any other health care providers outside of the [address] [place] [address] since your last visit? Include any pap smears or colon screening.  No

## 2023-05-18 NOTE — PROGRESS NOTES
Subjective:   Sophie Frias is a 81 y.o. male      Chief Complaint   Patient presents with    Back Pain     Back/neck pain left side/ back pain X5 days        History of present illness: He presents noting he awakened on the morning of the 13th with severe left flank pain. He had done work in his yard the day before but does not member injuring anything then. He took some anti-inflammatory that he had at home and old muscle relaxant which may have helped some but the pain persist.  It does not radiate into his leg. He has had no bowel or bladder dysfunction. He has no change in the color of his urine. It does not hurt with anything other than twisting and moving action. He cannot reproduce it by pressing on his back.     Patient Active Problem List   Diagnosis    Neoplasm of uncertain behavior of skin    Ptosis of eyelid, bilateral    ED (erectile dysfunction)    Prostate carcinoma ([address]    History of tobacco abuse    Mixed hyperlipidemia    Essential hypertension    Vitamin D deficiency    Alcohol screening    ASVD (arteriosclerotic vascular disease)    Glucose intolerance (impaired glucose tolerance)    Primary osteoarthritis involving multiple joints    Allergic dermatitis    Chronic right shoulder pain    Back strain      Past Medical History:   Diagnosis Date    Arthritis of multiple sites 12/21/2017    ASVD (arteriosclerotic vascular disease) 12/21/2017    Cancer ([address]     prostate    COVID 01/20/2023    DJD (degenerative joint disease) 12/21/2017    ED (erectile dysfunction) 12/21/2017    Glucose intolerance (impaired glucose tolerance) 12/21/2017    History of tobacco abuse 12/21/2017    HTN (hypertension) 12/21/2017    Hypercholesterolemia     Hyperlipidemia 12/21/2017    On long term drug therapy 12/21/2017    PAD (peripheral artery disease) (HCC)     left leg    Prostate carcinoma ([address] 12/21/2017      Allergies   Allergen Reactions    Bee Venom Swelling     Swelling (local area of sting) - per RN

## 2023-06-06 PROBLEM — M15.9 PRIMARY OSTEOARTHRITIS INVOLVING MULTIPLE JOINTS: Status: ACTIVE | Noted: 2018-06-28

## 2023-06-06 PROBLEM — E55.9 VITAMIN D DEFICIENCY: Status: ACTIVE | Noted: 2022-01-21

## 2023-06-06 PROBLEM — E78.2 MIXED HYPERLIPIDEMIA: Status: ACTIVE | Noted: 2017-12-20

## 2023-06-06 PROBLEM — R73.02 GLUCOSE INTOLERANCE (IMPAIRED GLUCOSE TOLERANCE): Status: ACTIVE | Noted: 2017-12-21

## 2023-06-06 PROBLEM — M15.0 PRIMARY OSTEOARTHRITIS INVOLVING MULTIPLE JOINTS: Status: ACTIVE | Noted: 2018-06-28

## 2023-06-06 PROBLEM — M54.50 ACUTE LEFT-SIDED LOW BACK PAIN WITHOUT SCIATICA: Status: ACTIVE | Noted: 2023-06-06

## 2023-06-06 PROBLEM — Z87.891 HISTORY OF TOBACCO ABUSE: Status: ACTIVE | Noted: 2017-12-21

## 2023-06-06 PROBLEM — I10 ESSENTIAL HYPERTENSION: Status: ACTIVE | Noted: 2017-12-21

## 2023-06-06 PROBLEM — C61 PROSTATE CARCINOMA (HCC): Status: ACTIVE | Noted: 2017-12-21

## 2023-06-07 ENCOUNTER — OFFICE VISIT (OUTPATIENT)
Facility: CLINIC | Age: 73
End: 2023-06-07
Payer: MEDICARE

## 2023-06-07 VITALS
HEIGHT: 68 IN | RESPIRATION RATE: 16 BRPM | TEMPERATURE: 97.7 F | DIASTOLIC BLOOD PRESSURE: 84 MMHG | SYSTOLIC BLOOD PRESSURE: 132 MMHG | HEART RATE: 62 BPM | WEIGHT: 179.8 LBS | BODY MASS INDEX: 27.25 KG/M2 | OXYGEN SATURATION: 96 %

## 2023-06-07 DIAGNOSIS — M54.50 ACUTE LEFT-SIDED LOW BACK PAIN WITHOUT SCIATICA: ICD-10-CM

## 2023-06-07 PROCEDURE — 3079F DIAST BP 80-89 MM HG: CPT | Performed by: INTERNAL MEDICINE

## 2023-06-07 PROCEDURE — G8419 CALC BMI OUT NRM PARAM NOF/U: HCPCS | Performed by: INTERNAL MEDICINE

## 2023-06-07 PROCEDURE — 1036F TOBACCO NON-USER: CPT | Performed by: INTERNAL MEDICINE

## 2023-06-07 PROCEDURE — 1123F ACP DISCUSS/DSCN MKR DOCD: CPT | Performed by: INTERNAL MEDICINE

## 2023-06-07 PROCEDURE — G8427 DOCREV CUR MEDS BY ELIG CLIN: HCPCS | Performed by: INTERNAL MEDICINE

## 2023-06-07 PROCEDURE — 3017F COLORECTAL CA SCREEN DOC REV: CPT | Performed by: INTERNAL MEDICINE

## 2023-06-07 PROCEDURE — 99213 OFFICE O/P EST LOW 20 MIN: CPT | Performed by: INTERNAL MEDICINE

## 2023-06-07 PROCEDURE — 3075F SYST BP GE 130 - 139MM HG: CPT | Performed by: INTERNAL MEDICINE

## 2023-06-07 RX ORDER — METHOCARBAMOL 750 MG/1
750 TABLET, FILM COATED ORAL 4 TIMES DAILY
Qty: 40 TABLET | Refills: 0 | Status: SHIPPED | OUTPATIENT
Start: 2023-06-07 | End: 2023-06-17

## 2023-06-07 RX ORDER — PREDNISONE 10 MG/1
TABLET ORAL
Qty: 1 EACH | Refills: 0 | Status: SHIPPED | OUTPATIENT
Start: 2023-06-07

## 2023-06-07 NOTE — PROGRESS NOTES
Chief Complaint   Patient presents with    Back Pain     3 week follow up     /84 (Site: Left Upper Arm, Position: Sitting, Cuff Size: Large Adult)   Pulse 62   Temp 97.7 °F (36.5 °C) (Oral)   Resp 16   Ht 5' 8\" (1.727 m)   Wt 179 lb 12.8 oz (81.6 kg)   SpO2 96%   BMI 27.34 kg/m²   1. Have you been to the ER, urgent care clinic since your last visit? Hospitalized since your last visit? No    2. Have you seen or consulted any other health care providers outside of the [address] [place] [address] since your last visit? Include any pap smears or colon screening.  No
masses  Extremities:  No clubbing, cyanosis or edema. Back: Mild paraspinal left sided discomfort of his lumbar region. This may be a mild discomfort with straight leg raising but no more than a 1 on a scale of 10 according to him  Skin:  No rash or unusual mole changes noted. Lymph Nodes:  None felt in the cervical, supraclavicular, axillary or inguinal region. Neurological: . DTRs 2+ and symmetric. Station and gait normal.   Hematologic:   No purpura or petechiae        Assessment/Plan:         1. Acute left-sided low back pain without sciatica        Impressions/Plan:  Impression  Continued left flank pain of unclear etiology. X-ray lumbar spine is unremarkable. Since he did get better with the prednisone Dosepak and the Robaxin I will give him a 10 mg 12-day Dosepak along with Robaxin for 10 days. If not improved with that then I think we need to proceed further with a CT of the abdomen just to make sure there is nothing going on in the left flank as this seems to be more of a deep pain and are not really get a lot of change with maneuver of the back. Orders Placed This Encounter   Procedures    XR LUMBAR SPINE (2-3 VIEWS)     Standing Status:   Future     Standing Expiration Date:   6/7/2024     Order Specific Question:   Reason for exam:     Answer:   low back pain          Return TBD. No results found for any visits on 06/07/23. Andrew Feng MD    The patient was given after the visit summary the patient verbalized an understanding of the plans and problems as explained.

## 2023-08-02 ENCOUNTER — OFFICE VISIT (OUTPATIENT)
Facility: CLINIC | Age: 73
End: 2023-08-02
Payer: MEDICARE

## 2023-08-02 VITALS
HEIGHT: 68 IN | TEMPERATURE: 98.1 F | WEIGHT: 181.9 LBS | DIASTOLIC BLOOD PRESSURE: 84 MMHG | BODY MASS INDEX: 27.57 KG/M2 | HEART RATE: 61 BPM | SYSTOLIC BLOOD PRESSURE: 136 MMHG | OXYGEN SATURATION: 93 % | RESPIRATION RATE: 18 BRPM

## 2023-08-02 DIAGNOSIS — E78.2 MIXED HYPERLIPIDEMIA: ICD-10-CM

## 2023-08-02 DIAGNOSIS — R10.9 FLANK PAIN: ICD-10-CM

## 2023-08-02 DIAGNOSIS — I10 ESSENTIAL HYPERTENSION: Primary | ICD-10-CM

## 2023-08-02 DIAGNOSIS — M15.9 PRIMARY OSTEOARTHRITIS INVOLVING MULTIPLE JOINTS: ICD-10-CM

## 2023-08-02 DIAGNOSIS — Z87.891 HISTORY OF TOBACCO ABUSE: ICD-10-CM

## 2023-08-02 DIAGNOSIS — R73.02 GLUCOSE INTOLERANCE (IMPAIRED GLUCOSE TOLERANCE): ICD-10-CM

## 2023-08-02 PROCEDURE — 99214 OFFICE O/P EST MOD 30 MIN: CPT | Performed by: INTERNAL MEDICINE

## 2023-08-02 PROCEDURE — 3017F COLORECTAL CA SCREEN DOC REV: CPT | Performed by: INTERNAL MEDICINE

## 2023-08-02 PROCEDURE — 1036F TOBACCO NON-USER: CPT | Performed by: INTERNAL MEDICINE

## 2023-08-02 PROCEDURE — 3079F DIAST BP 80-89 MM HG: CPT | Performed by: INTERNAL MEDICINE

## 2023-08-02 PROCEDURE — 3075F SYST BP GE 130 - 139MM HG: CPT | Performed by: INTERNAL MEDICINE

## 2023-08-02 PROCEDURE — 1123F ACP DISCUSS/DSCN MKR DOCD: CPT | Performed by: INTERNAL MEDICINE

## 2023-08-02 PROCEDURE — G8419 CALC BMI OUT NRM PARAM NOF/U: HCPCS | Performed by: INTERNAL MEDICINE

## 2023-08-02 PROCEDURE — G8427 DOCREV CUR MEDS BY ELIG CLIN: HCPCS | Performed by: INTERNAL MEDICINE

## 2023-08-03 LAB
ALBUMIN SERPL-MCNC: 4 G/DL (ref 3.5–5)
ALBUMIN/GLOB SERPL: 1.1 (ref 1.1–2.2)
ALP SERPL-CCNC: 86 U/L (ref 45–117)
ALT SERPL-CCNC: 67 U/L (ref 12–78)
ANION GAP SERPL CALC-SCNC: 10 MMOL/L (ref 5–15)
AST SERPL-CCNC: 37 U/L (ref 15–37)
BILIRUB SERPL-MCNC: 0.5 MG/DL (ref 0.2–1)
BUN SERPL-MCNC: 15 MG/DL (ref 6–20)
BUN/CREAT SERPL: 19 (ref 12–20)
CALCIUM SERPL-MCNC: 9.2 MG/DL (ref 8.5–10.1)
CHLORIDE SERPL-SCNC: 107 MMOL/L (ref 97–108)
CHOLEST SERPL-MCNC: 150 MG/DL
CK SERPL-CCNC: 70 U/L (ref 39–308)
CO2 SERPL-SCNC: 24 MMOL/L (ref 21–32)
CREAT SERPL-MCNC: 0.78 MG/DL (ref 0.7–1.3)
EST. AVERAGE GLUCOSE BLD GHB EST-MCNC: 140 MG/DL
GLOBULIN SER CALC-MCNC: 3.8 G/DL (ref 2–4)
GLUCOSE SERPL-MCNC: 107 MG/DL (ref 65–100)
HBA1C MFR BLD: 6.5 % (ref 4–5.6)
HDLC SERPL-MCNC: 38 MG/DL
HDLC SERPL: 3.9 (ref 0–5)
LDLC SERPL CALC-MCNC: 88.4 MG/DL (ref 0–100)
POTASSIUM SERPL-SCNC: 4 MMOL/L (ref 3.5–5.1)
PROT SERPL-MCNC: 7.8 G/DL (ref 6.4–8.2)
SODIUM SERPL-SCNC: 141 MMOL/L (ref 136–145)
TRIGL SERPL-MCNC: 118 MG/DL
VLDLC SERPL CALC-MCNC: 23.6 MG/DL

## 2023-08-06 ENCOUNTER — HOSPITAL ENCOUNTER (OUTPATIENT)
Facility: HOSPITAL | Age: 73
Discharge: HOME | End: 2023-08-09
Attending: INTERNAL MEDICINE
Payer: MEDICARE

## 2023-08-06 DIAGNOSIS — R10.9 FLANK PAIN: ICD-10-CM

## 2023-08-06 PROCEDURE — 74176 CT ABD & PELVIS W/O CONTRAST: CPT

## 2023-09-07 ENCOUNTER — HOSPITAL ENCOUNTER (OUTPATIENT)
Facility: HOSPITAL | Age: 73
Discharge: HOME | End: 2023-09-07
Attending: ORTHOPAEDIC SURGERY
Payer: MEDICARE

## 2023-09-07 DIAGNOSIS — M19.011 PRIMARY OSTEOARTHRITIS, RIGHT SHOULDER: ICD-10-CM

## 2023-09-07 PROCEDURE — 73200 CT UPPER EXTREMITY W/O DYE: CPT

## 2023-11-22 RX ORDER — ROSUVASTATIN CALCIUM 20 MG/1
TABLET, COATED ORAL
Qty: 90 TABLET | Refills: 3 | Status: SHIPPED | OUTPATIENT
Start: 2023-11-22

## 2023-11-22 RX ORDER — NIACIN 500 MG/1
500 TABLET, EXTENDED RELEASE ORAL NIGHTLY
Qty: 90 TABLET | Refills: 3 | Status: SHIPPED | OUTPATIENT
Start: 2023-11-22

## 2023-11-22 NOTE — TELEPHONE ENCOUNTER
Requested Prescriptions     Pending Prescriptions Disp Refills    rosuvastatin (CRESTOR) 20 MG tablet [Pharmacy Med Name: ROSUVASTATIN TABS 20MG] 90 tablet 3     Sig: TAKE 1 TABLET NIGHTLY (TO REPLACE SIMVASTATIN 80 MG DAILY)    niacin (NIASPAN) 500 MG extended release tablet [Pharmacy Med Name: NIACIN ER TABS 500MG] 90 tablet 3     Sig: TAKE 1 TABLET NIGHTLY       RX refill request from the patient/pharmacy. Patient last seen 8/2/23 with labs, and next appt. scheduled for 2/2/24.

## 2024-01-30 SDOH — HEALTH STABILITY: PHYSICAL HEALTH: ON AVERAGE, HOW MANY MINUTES DO YOU ENGAGE IN EXERCISE AT THIS LEVEL?: 50 MIN

## 2024-01-30 SDOH — HEALTH STABILITY: PHYSICAL HEALTH: ON AVERAGE, HOW MANY DAYS PER WEEK DO YOU ENGAGE IN MODERATE TO STRENUOUS EXERCISE (LIKE A BRISK WALK)?: 7 DAYS

## 2024-01-30 ASSESSMENT — LIFESTYLE VARIABLES
HOW OFTEN DO YOU HAVE A DRINK CONTAINING ALCOHOL: 2
HOW OFTEN DO YOU HAVE A DRINK CONTAINING ALCOHOL: MONTHLY OR LESS
HOW OFTEN DO YOU HAVE SIX OR MORE DRINKS ON ONE OCCASION: 1

## 2024-01-30 ASSESSMENT — PATIENT HEALTH QUESTIONNAIRE - PHQ9
SUM OF ALL RESPONSES TO PHQ QUESTIONS 1-9: 0
SUM OF ALL RESPONSES TO PHQ QUESTIONS 1-9: 0
SUM OF ALL RESPONSES TO PHQ9 QUESTIONS 1 & 2: 0
2. FEELING DOWN, DEPRESSED OR HOPELESS: 0
1. LITTLE INTEREST OR PLEASURE IN DOING THINGS: 0
SUM OF ALL RESPONSES TO PHQ QUESTIONS 1-9: 0
SUM OF ALL RESPONSES TO PHQ QUESTIONS 1-9: 0

## 2024-02-02 ENCOUNTER — OFFICE VISIT (OUTPATIENT)
Facility: CLINIC | Age: 74
End: 2024-02-02

## 2024-02-02 VITALS
BODY MASS INDEX: 27.29 KG/M2 | HEIGHT: 67 IN | TEMPERATURE: 97.5 F | DIASTOLIC BLOOD PRESSURE: 86 MMHG | RESPIRATION RATE: 18 BRPM | WEIGHT: 173.9 LBS | OXYGEN SATURATION: 96 % | SYSTOLIC BLOOD PRESSURE: 131 MMHG | HEART RATE: 60 BPM

## 2024-02-02 DIAGNOSIS — I10 ESSENTIAL HYPERTENSION: Primary | ICD-10-CM

## 2024-02-02 DIAGNOSIS — Z00.00 MEDICARE ANNUAL WELLNESS VISIT, SUBSEQUENT: ICD-10-CM

## 2024-02-02 DIAGNOSIS — C61 PROSTATE CARCINOMA (HCC): ICD-10-CM

## 2024-02-02 DIAGNOSIS — I70.90 ASVD (ARTERIOSCLEROTIC VASCULAR DISEASE): ICD-10-CM

## 2024-02-02 DIAGNOSIS — E78.2 MIXED HYPERLIPIDEMIA: ICD-10-CM

## 2024-02-02 DIAGNOSIS — M15.9 PRIMARY OSTEOARTHRITIS INVOLVING MULTIPLE JOINTS: ICD-10-CM

## 2024-02-02 DIAGNOSIS — Z87.891 PERSONAL HISTORY OF TOBACCO USE: ICD-10-CM

## 2024-02-02 DIAGNOSIS — R73.02 GLUCOSE INTOLERANCE (IMPAIRED GLUCOSE TOLERANCE): ICD-10-CM

## 2024-02-02 DIAGNOSIS — Z87.891 HISTORY OF TOBACCO ABUSE: ICD-10-CM

## 2024-02-02 DIAGNOSIS — Z13.39 ALCOHOL SCREENING: ICD-10-CM

## 2024-02-02 DIAGNOSIS — E55.9 VITAMIN D DEFICIENCY: ICD-10-CM

## 2024-02-02 ASSESSMENT — PATIENT HEALTH QUESTIONNAIRE - PHQ9
2. FEELING DOWN, DEPRESSED OR HOPELESS: 0
SUM OF ALL RESPONSES TO PHQ QUESTIONS 1-9: 0
SUM OF ALL RESPONSES TO PHQ QUESTIONS 1-9: 0
SUM OF ALL RESPONSES TO PHQ9 QUESTIONS 1 & 2: 0
SUM OF ALL RESPONSES TO PHQ QUESTIONS 1-9: 0
SUM OF ALL RESPONSES TO PHQ QUESTIONS 1-9: 0
1. LITTLE INTEREST OR PLEASURE IN DOING THINGS: 0

## 2024-02-02 ASSESSMENT — LIFESTYLE VARIABLES
HOW MANY STANDARD DRINKS CONTAINING ALCOHOL DO YOU HAVE ON A TYPICAL DAY: 1 OR 2
HOW OFTEN DO YOU HAVE A DRINK CONTAINING ALCOHOL: 2-4 TIMES A MONTH

## 2024-02-02 NOTE — PATIENT INSTRUCTIONS
activities.     Do not smoke. If you need help quitting, talk to your doctor about stop-smoking programs and medicines. These can increase your chances of quitting for good. Quitting smoking may be the most important step you can take to protect your heart. It is never too late to quit.     Limit alcohol to 2 drinks a day for men and 1 drink a day for women. Too much alcohol can cause health problems.     Manage other health problems such as diabetes, high blood pressure, and high cholesterol. If you think you may have a problem with alcohol or drug use, talk to your doctor.   Medicines    Take your medicines exactly as prescribed. Call your doctor if you think you are having a problem with your medicine.     If your doctor recommends aspirin, take the amount directed each day. Make sure you take aspirin and not another kind of pain reliever, such as acetaminophen (Tylenol).   When should you call for help?   Call 911 if you have symptoms of a heart attack. These may include:    Chest pain or pressure, or a strange feeling in the chest.     Sweating.     Shortness of breath.     Pain, pressure, or a strange feeling in the back, neck, jaw, or upper belly or in one or both shoulders or arms.     Lightheadedness or sudden weakness.     A fast or irregular heartbeat.   After you call 911, the  may tell you to chew 1 adult-strength or 2 to 4 low-dose aspirin. Wait for an ambulance. Do not try to drive yourself.  Watch closely for changes in your health, and be sure to contact your doctor if you have any problems.  Where can you learn more?  Go to https://www.[employer]net/patientEd and enter F075 to learn more about \"A Healthy Heart: Care Instructions.\"  Current as of: June 25, 2023               Content Version: 13.9  © 7357-3525 Healthwise, Inc[employer].   Care instructions adapted under license by [employer]. If you have questions about a medical condition or this instruction, always ask your healthcare

## 2024-02-02 NOTE — PROGRESS NOTES
Joshua Cardoza is a 73 y.o. male     Chief Complaint   Patient presents with    Medicare AWV       /86 (Site: Left Upper Arm, Position: Sitting, Cuff Size: Medium Adult)   Pulse 60   Temp 97.5 °F (36.4 °C) (Oral)   Resp 18   Ht 1.702 m (5' 7\")   Wt 78.9 kg (173 lb 14.4 oz)   SpO2 96%   BMI 27.24 kg/m²     Health Maintenance Due   Topic Date Due    Low dose CT lung screening &/or counseling  Never done    Respiratory Syncytial Virus (RSV) Pregnant or age 60 yrs+ (1 - 1-dose 60+ series) Never done    Flu vaccine (1) 08/01/2023    COVID-19 Vaccine (4 - 2023-24 season) 09/01/2023    Colorectal Cancer Screen  09/14/2023    A1C test (Diabetic or Prediabetic)  11/02/2023    Prostate Specific Antigen (PSA) Screening or Monitoring  02/02/2024         \"Have you been to the ER, urgent care clinic since your last visit?  Hospitalized since your last visit?\"    NO    “Have you seen or consulted any other health care providers outside of [facility] since your last visit?”    NO    “Have you had a colorectal cancer screening such as a colonoscopy/FIT/Cologuard?    YES - Type: Colonoscopy - Where: 12/20/23 Dr. Richard Nurse/CMA to request most recent records if not in the chart                    
follow-up provider specified.      Morris Burnett MD  Recommendations for Preventive Services Due: see orders and patient instructions/AVS.  Recommended screening schedule for the next 5-10 years is provided to the patient in written form: see Patient Instructions/AVS.     Return in 3 months (on 5/2/2024).     Subjective   He presents today for his Medicare subsequent annual wellness examination and screening questionnaire.    He is also here in follow-up of his multiple medical problems include hypertension, glucose intolerance, hyperlipidemia, DJD, prostate carcinoma post prostatectomy, history of tobacco abuse without smoking now for 11 years and other multiple medical problems.  He is taking his medication transformers diet remains physically active.  Currently he denies any chest pain, shortness of breath or cardiac respiratory complaints.  He notes no GI or  complaints.  He notes no headaches, dizziness or neurologic complaints.  He has no current active arthritic complaints and no other complaints on complete review of systems.    Patient's complete Health Risk Assessment and screening values have been reviewed and are found in Flowsheets. The following problems were reviewed today and where indicated follow up appointments were made and/or referrals ordered.    No Positive Risk Factors identified today.    LDCT Screening: Discussed with patient the benefits and harms of screening, follow-up diagnostic testing, over-diagnosis, false positive rate, and total radiation exposure. Counseled on the importance of adherence to annual lung cancer LDCT screening, impact of comorbidities, ability and willingness to undergo diagnosis and treatment. Counseled on the importance of maintaining cigarette smoking abstinence and cessation. The patient has a history of >20 pack years and is either still smoking or quit within the last 15 years. There are no signs or symptoms of lung cancer.                      Lung Cancer

## 2024-02-03 LAB
25(OH)D3 SERPL-MCNC: 55 NG/ML (ref 30–100)
ALBUMIN SERPL-MCNC: 3.9 G/DL (ref 3.5–5)
ALBUMIN/GLOB SERPL: 1.1 (ref 1.1–2.2)
ALP SERPL-CCNC: 74 U/L (ref 45–117)
ALT SERPL-CCNC: 50 U/L (ref 12–78)
ANION GAP SERPL CALC-SCNC: 4 MMOL/L (ref 5–15)
APPEARANCE UR: CLEAR
AST SERPL-CCNC: 29 U/L (ref 15–37)
BACTERIA URNS QL MICRO: NEGATIVE /HPF
BASOPHILS # BLD: 0 K/UL (ref 0–0.1)
BASOPHILS NFR BLD: 0 % (ref 0–1)
BILIRUB SERPL-MCNC: 0.6 MG/DL (ref 0.2–1)
BILIRUB UR QL: NEGATIVE
BUN SERPL-MCNC: 17 MG/DL (ref 6–20)
BUN/CREAT SERPL: 20 (ref 12–20)
CALCIUM SERPL-MCNC: 8.8 MG/DL (ref 8.5–10.1)
CHLORIDE SERPL-SCNC: 104 MMOL/L (ref 97–108)
CHOLEST SERPL-MCNC: 126 MG/DL
CK SERPL-CCNC: 91 U/L (ref 39–308)
CO2 SERPL-SCNC: 29 MMOL/L (ref 21–32)
COLOR UR: NORMAL
CREAT SERPL-MCNC: 0.83 MG/DL (ref 0.7–1.3)
DIFFERENTIAL METHOD BLD: ABNORMAL
EOSINOPHIL # BLD: 0.5 K/UL (ref 0–0.4)
EOSINOPHIL NFR BLD: 6 % (ref 0–7)
EPITH CASTS URNS QL MICRO: NORMAL /LPF
ERYTHROCYTE [DISTWIDTH] IN BLOOD BY AUTOMATED COUNT: 13.6 % (ref 11.5–14.5)
EST. AVERAGE GLUCOSE BLD GHB EST-MCNC: 131 MG/DL
GLOBULIN SER CALC-MCNC: 3.7 G/DL (ref 2–4)
GLUCOSE SERPL-MCNC: 110 MG/DL (ref 65–100)
GLUCOSE UR STRIP.AUTO-MCNC: NEGATIVE MG/DL
HBA1C MFR BLD: 6.2 % (ref 4–5.6)
HCT VFR BLD AUTO: 45.2 % (ref 36.6–50.3)
HDLC SERPL-MCNC: 38 MG/DL
HDLC SERPL: 3.3 (ref 0–5)
HGB BLD-MCNC: 15 G/DL (ref 12.1–17)
HGB UR QL STRIP: NEGATIVE
IMM GRANULOCYTES # BLD AUTO: 0 K/UL (ref 0–0.04)
IMM GRANULOCYTES NFR BLD AUTO: 0 % (ref 0–0.5)
KETONES UR QL STRIP.AUTO: NEGATIVE MG/DL
LDLC SERPL CALC-MCNC: 68.2 MG/DL (ref 0–100)
LEUKOCYTE ESTERASE UR QL STRIP.AUTO: NEGATIVE
LYMPHOCYTES # BLD: 2 K/UL (ref 0.8–3.5)
LYMPHOCYTES NFR BLD: 25 % (ref 12–49)
MCH RBC QN AUTO: 29.5 PG (ref 26–34)
MCHC RBC AUTO-ENTMCNC: 33.2 G/DL (ref 30–36.5)
MCV RBC AUTO: 89 FL (ref 80–99)
MONOCYTES # BLD: 0.5 K/UL (ref 0–1)
MONOCYTES NFR BLD: 7 % (ref 5–13)
NEUTS SEG # BLD: 5.2 K/UL (ref 1.8–8)
NEUTS SEG NFR BLD: 62 % (ref 32–75)
NITRITE UR QL STRIP.AUTO: NEGATIVE
NRBC # BLD: 0 K/UL (ref 0–0.01)
NRBC BLD-RTO: 0 PER 100 WBC
PH UR STRIP: 6 (ref 5–8)
PLATELET # BLD AUTO: 205 K/UL (ref 150–400)
PMV BLD AUTO: 10.5 FL (ref 8.9–12.9)
POTASSIUM SERPL-SCNC: 4.5 MMOL/L (ref 3.5–5.1)
PROT SERPL-MCNC: 7.6 G/DL (ref 6.4–8.2)
PROT UR STRIP-MCNC: NEGATIVE MG/DL
PSA SERPL-MCNC: 0 NG/ML (ref 0.01–4)
RBC # BLD AUTO: 5.08 M/UL (ref 4.1–5.7)
RBC #/AREA URNS HPF: NORMAL /HPF (ref 0–5)
SODIUM SERPL-SCNC: 137 MMOL/L (ref 136–145)
SP GR UR REFRACTOMETRY: 1.01 (ref 1–1.03)
T4 FREE SERPL-MCNC: 1 NG/DL (ref 0.8–1.5)
TRIGL SERPL-MCNC: 99 MG/DL
TSH SERPL DL<=0.05 MIU/L-ACNC: 3.65 UIU/ML (ref 0.36–3.74)
UROBILINOGEN UR QL STRIP.AUTO: 0.2 EU/DL (ref 0.2–1)
VLDLC SERPL CALC-MCNC: 19.8 MG/DL
WBC # BLD AUTO: 8.2 K/UL (ref 4.1–11.1)
WBC URNS QL MICRO: NORMAL /HPF (ref 0–4)

## 2024-02-08 ENCOUNTER — HOSPITAL ENCOUNTER (OUTPATIENT)
Facility: HOSPITAL | Age: 74
Discharge: HOME | End: 2024-02-08
Attending: INTERNAL MEDICINE
Payer: MEDICARE

## 2024-02-08 DIAGNOSIS — Z87.891 PERSONAL HISTORY OF TOBACCO USE: ICD-10-CM

## 2024-02-08 PROCEDURE — 71271 CT THORAX LUNG CANCER SCR C-: CPT

## 2024-03-02 PROBLEM — Z00.00 MEDICARE ANNUAL WELLNESS VISIT, SUBSEQUENT: Status: RESOLVED | Noted: 2022-01-21 | Resolved: 2024-03-02

## 2024-06-29 SDOH — ECONOMIC STABILITY: HOUSING INSECURITY
IN THE LAST 12 MONTHS, WAS THERE A TIME WHEN YOU DID NOT HAVE A STEADY PLACE TO SLEEP OR SLEPT IN A SHELTER (INCLUDING NOW)?: PATIENT DECLINED

## 2024-06-29 SDOH — ECONOMIC STABILITY: FOOD INSECURITY: WITHIN THE PAST 12 MONTHS, YOU WORRIED THAT YOUR FOOD WOULD RUN OUT BEFORE YOU GOT MONEY TO BUY MORE.: PATIENT DECLINED

## 2024-06-29 SDOH — ECONOMIC STABILITY: INCOME INSECURITY: HOW HARD IS IT FOR YOU TO PAY FOR THE VERY BASICS LIKE FOOD, HOUSING, MEDICAL CARE, AND HEATING?: PATIENT DECLINED

## 2024-06-29 SDOH — ECONOMIC STABILITY: FOOD INSECURITY: WITHIN THE PAST 12 MONTHS, THE FOOD YOU BOUGHT JUST DIDN'T LAST AND YOU DIDN'T HAVE MONEY TO GET MORE.: PATIENT DECLINED

## 2024-06-30 NOTE — PROGRESS NOTES
Chief Complaint   Patient presents with    6 Month Follow-Up       SUBJECTIVE:    Joshua Cardoza is a 74 y.o. male who returns in follow-up of his medical problems include hypertension, glucose intolerance, hyperlipidemia, DJD, ASVD and other medical problems.  He is taking his medication trying to follow his diet remains physically active.  Currently he notes no chest pain, shortness of breath or cardiac respiratory complaints.  No current GI or  complaints.  He has no headaches, dizziness or neurologic complaints.  He has no current active arthritic complaints and there are no other complaints or complete review of systems.      Current Outpatient Medications   Medication Sig Dispense Refill    Multiple Vitamins-Minerals (ICAPS AREDS 2 PO) Take by mouth      Cholecalciferol (VITAMIN D3) 125 MCG (5000 UT) TABS Take by mouth daily      rosuvastatin (CRESTOR) 20 MG tablet TAKE 1 TABLET NIGHTLY (TO REPLACE SIMVASTATIN 80 MG DAILY) 90 tablet 3    niacin (NIASPAN) 500 MG extended release tablet TAKE 1 TABLET NIGHTLY 90 tablet 3    Omega-3 Fatty Acids (FISH OIL) 1200 MG CAPS Take by mouth      aspirin 325 MG tablet Take 81 mg by mouth daily       No current facility-administered medications for this visit.     Past Medical History:   Diagnosis Date    Arthritis of multiple sites 12/21/2017    ASVD (arteriosclerotic vascular disease) 12/21/2017    Cancer (HCC)     prostate    COVID 01/20/2023    DJD (degenerative joint disease) 12/21/2017    ED (erectile dysfunction) 12/21/2017    Glucose intolerance (impaired glucose tolerance) 12/21/2017    History of tobacco abuse 12/21/2017    HTN (hypertension) 12/21/2017    Hypercholesterolemia     Hyperlipidemia 12/21/2017    On long term drug therapy 12/21/2017    PAD (peripheral artery disease) (HCC)     left leg    Prostate carcinoma (HCC) 12/21/2017     Past Surgical History:   Procedure Laterality Date    GI      HEMORRHOID SURGERY      VASCULAR SURGERY      left leg

## 2024-07-02 ENCOUNTER — OFFICE VISIT (OUTPATIENT)
Facility: CLINIC | Age: 74
End: 2024-07-02
Payer: MEDICARE

## 2024-07-02 VITALS
RESPIRATION RATE: 16 BRPM | HEIGHT: 67 IN | OXYGEN SATURATION: 97 % | HEART RATE: 56 BPM | DIASTOLIC BLOOD PRESSURE: 80 MMHG | TEMPERATURE: 97.7 F | SYSTOLIC BLOOD PRESSURE: 130 MMHG | BODY MASS INDEX: 27.31 KG/M2 | WEIGHT: 174 LBS

## 2024-07-02 DIAGNOSIS — I10 ESSENTIAL HYPERTENSION: Primary | ICD-10-CM

## 2024-07-02 DIAGNOSIS — M15.9 PRIMARY OSTEOARTHRITIS INVOLVING MULTIPLE JOINTS: ICD-10-CM

## 2024-07-02 DIAGNOSIS — E78.2 MIXED HYPERLIPIDEMIA: ICD-10-CM

## 2024-07-02 DIAGNOSIS — Z87.891 HISTORY OF TOBACCO ABUSE: ICD-10-CM

## 2024-07-02 DIAGNOSIS — R73.02 GLUCOSE INTOLERANCE (IMPAIRED GLUCOSE TOLERANCE): ICD-10-CM

## 2024-07-02 PROCEDURE — 1123F ACP DISCUSS/DSCN MKR DOCD: CPT | Performed by: INTERNAL MEDICINE

## 2024-07-02 PROCEDURE — 3017F COLORECTAL CA SCREEN DOC REV: CPT | Performed by: INTERNAL MEDICINE

## 2024-07-02 PROCEDURE — 3079F DIAST BP 80-89 MM HG: CPT | Performed by: INTERNAL MEDICINE

## 2024-07-02 PROCEDURE — 99214 OFFICE O/P EST MOD 30 MIN: CPT | Performed by: INTERNAL MEDICINE

## 2024-07-02 PROCEDURE — 1036F TOBACCO NON-USER: CPT | Performed by: INTERNAL MEDICINE

## 2024-07-02 PROCEDURE — 3075F SYST BP GE 130 - 139MM HG: CPT | Performed by: INTERNAL MEDICINE

## 2024-07-02 PROCEDURE — G8419 CALC BMI OUT NRM PARAM NOF/U: HCPCS | Performed by: INTERNAL MEDICINE

## 2024-07-02 PROCEDURE — G8427 DOCREV CUR MEDS BY ELIG CLIN: HCPCS | Performed by: INTERNAL MEDICINE

## 2024-07-02 NOTE — PROGRESS NOTES
Joshua Cardoza is a 74 y.o. male     Chief Complaint   Patient presents with    6 Month Follow-Up       /80 (Site: Left Upper Arm, Position: Sitting, Cuff Size: Large Adult)   Pulse 56   Temp 97.7 °F (36.5 °C) (Oral)   Resp 16   Ht 1.702 m (5' 7\")   Wt 78.9 kg (174 lb)   SpO2 97%   BMI 27.25 kg/m²     Health Maintenance Due   Topic Date Due    COVID-19 Vaccine (5 - 2023-24 season) 11/11/2023         \"Have you been to the ER, urgent care clinic since your last visit?  Hospitalized since your last visit?\"    NO    “Have you seen or consulted any other health care providers outside of [facility] since your last visit?”    NO

## 2024-07-03 LAB
ALBUMIN SERPL-MCNC: 3.7 G/DL (ref 3.5–5)
ALBUMIN/GLOB SERPL: 1 (ref 1.1–2.2)
ALP SERPL-CCNC: 94 U/L (ref 45–117)
ALT SERPL-CCNC: 54 U/L (ref 12–78)
ANION GAP SERPL CALC-SCNC: 7 MMOL/L (ref 5–15)
AST SERPL-CCNC: 28 U/L (ref 15–37)
BILIRUB SERPL-MCNC: 0.5 MG/DL (ref 0.2–1)
BUN SERPL-MCNC: 17 MG/DL (ref 6–20)
BUN/CREAT SERPL: 20 (ref 12–20)
CALCIUM SERPL-MCNC: 9.2 MG/DL (ref 8.5–10.1)
CHLORIDE SERPL-SCNC: 105 MMOL/L (ref 97–108)
CHOLEST SERPL-MCNC: 128 MG/DL
CK SERPL-CCNC: 74 U/L (ref 39–308)
CO2 SERPL-SCNC: 26 MMOL/L (ref 21–32)
CREAT SERPL-MCNC: 0.84 MG/DL (ref 0.7–1.3)
EST. AVERAGE GLUCOSE BLD GHB EST-MCNC: 128 MG/DL
GLOBULIN SER CALC-MCNC: 3.8 G/DL (ref 2–4)
GLUCOSE SERPL-MCNC: 109 MG/DL (ref 65–100)
HBA1C MFR BLD: 6.1 % (ref 4–5.6)
HDLC SERPL-MCNC: 37 MG/DL
HDLC SERPL: 3.5 (ref 0–5)
LDLC SERPL CALC-MCNC: 71.6 MG/DL (ref 0–100)
POTASSIUM SERPL-SCNC: 4.3 MMOL/L (ref 3.5–5.1)
PROT SERPL-MCNC: 7.5 G/DL (ref 6.4–8.2)
SODIUM SERPL-SCNC: 138 MMOL/L (ref 136–145)
TRIGL SERPL-MCNC: 97 MG/DL
VLDLC SERPL CALC-MCNC: 19.4 MG/DL

## 2024-12-30 RX ORDER — ROSUVASTATIN CALCIUM 20 MG/1
TABLET, COATED ORAL
Qty: 90 TABLET | Refills: 3 | Status: SHIPPED | OUTPATIENT
Start: 2024-12-30

## 2024-12-30 NOTE — TELEPHONE ENCOUNTER
RX refill request from the patient/pharmacy. Patient last seen 07- with labs, and next appt. scheduled for 02-  Requested Prescriptions     Pending Prescriptions Disp Refills    rosuvastatin (CRESTOR) 20 MG tablet [Pharmacy Med Name: ROSUVASTATIN TABS 20MG] 90 tablet 3     Sig: TAKE 1 TABLET NIGHTLY (TO REPLACE SIMVASTATIN 80 MG DAILY)    .

## 2025-01-27 SDOH — HEALTH STABILITY: PHYSICAL HEALTH: ON AVERAGE, HOW MANY MINUTES DO YOU ENGAGE IN EXERCISE AT THIS LEVEL?: 40 MIN

## 2025-01-27 SDOH — HEALTH STABILITY: PHYSICAL HEALTH: ON AVERAGE, HOW MANY DAYS PER WEEK DO YOU ENGAGE IN MODERATE TO STRENUOUS EXERCISE (LIKE A BRISK WALK)?: 2 DAYS

## 2025-01-27 ASSESSMENT — PATIENT HEALTH QUESTIONNAIRE - PHQ9
2. FEELING DOWN, DEPRESSED OR HOPELESS: NOT AT ALL
SUM OF ALL RESPONSES TO PHQ QUESTIONS 1-9: 0
SUM OF ALL RESPONSES TO PHQ9 QUESTIONS 1 & 2: 0
1. LITTLE INTEREST OR PLEASURE IN DOING THINGS: NOT AT ALL
SUM OF ALL RESPONSES TO PHQ QUESTIONS 1-9: 0

## 2025-01-27 ASSESSMENT — LIFESTYLE VARIABLES
HOW MANY STANDARD DRINKS CONTAINING ALCOHOL DO YOU HAVE ON A TYPICAL DAY: 1 OR 2
HOW OFTEN DO YOU HAVE A DRINK CONTAINING ALCOHOL: 2
HAVE YOU OR SOMEONE ELSE BEEN INJURED AS A RESULT OF YOUR DRINKING: NO
HOW OFTEN DO YOU HAVE SIX OR MORE DRINKS ON ONE OCCASION: 2
HOW OFTEN DURING THE LAST YEAR HAVE YOU NEEDED AN ALCOHOLIC DRINK FIRST THING IN THE MORNING TO GET YOURSELF GOING AFTER A NIGHT OF HEAVY DRINKING: NEVER
HOW OFTEN DURING THE LAST YEAR HAVE YOU NEEDED AN ALCOHOLIC DRINK FIRST THING IN THE MORNING TO GET YOURSELF GOING AFTER A NIGHT OF HEAVY DRINKING: NEVER
HOW OFTEN DURING THE LAST YEAR HAVE YOU HAD A FEELING OF GUILT OR REMORSE AFTER DRINKING: NEVER
HOW OFTEN DO YOU HAVE A DRINK CONTAINING ALCOHOL: MONTHLY OR LESS
HAS A RELATIVE, FRIEND, DOCTOR, OR ANOTHER HEALTH PROFESSIONAL EXPRESSED CONCERN ABOUT YOUR DRINKING OR SUGGESTED YOU CUT DOWN: NO
HOW OFTEN DURING THE LAST YEAR HAVE YOU FAILED TO DO WHAT WAS NORMALLY EXPECTED FROM YOU BECAUSE OF DRINKING: NEVER
HAS A RELATIVE, FRIEND, DOCTOR, OR ANOTHER HEALTH PROFESSIONAL EXPRESSED CONCERN ABOUT YOUR DRINKING OR SUGGESTED YOU CUT DOWN: NO
HAVE YOU OR SOMEONE ELSE BEEN INJURED AS A RESULT OF YOUR DRINKING: NO
HOW OFTEN DURING THE LAST YEAR HAVE YOU FAILED TO DO WHAT WAS NORMALLY EXPECTED FROM YOU BECAUSE OF DRINKING: NEVER
HOW MANY STANDARD DRINKS CONTAINING ALCOHOL DO YOU HAVE ON A TYPICAL DAY: 1
HOW OFTEN DURING THE LAST YEAR HAVE YOU BEEN UNABLE TO REMEMBER WHAT HAPPENED THE NIGHT BEFORE BECAUSE YOU HAD BEEN DRINKING: NEVER
HOW OFTEN DURING THE LAST YEAR HAVE YOU FOUND THAT YOU WERE NOT ABLE TO STOP DRINKING ONCE YOU HAD STARTED: NEVER
HOW OFTEN DURING THE LAST YEAR HAVE YOU BEEN UNABLE TO REMEMBER WHAT HAPPENED THE NIGHT BEFORE BECAUSE YOU HAD BEEN DRINKING: NEVER
HOW OFTEN DURING THE LAST YEAR HAVE YOU FOUND THAT YOU WERE NOT ABLE TO STOP DRINKING ONCE YOU HAD STARTED: NEVER
HOW OFTEN DURING THE LAST YEAR HAVE YOU HAD A FEELING OF GUILT OR REMORSE AFTER DRINKING: NEVER

## 2025-01-31 SDOH — ECONOMIC STABILITY: FOOD INSECURITY: WITHIN THE PAST 12 MONTHS, THE FOOD YOU BOUGHT JUST DIDN'T LAST AND YOU DIDN'T HAVE MONEY TO GET MORE.: PATIENT DECLINED

## 2025-01-31 SDOH — ECONOMIC STABILITY: INCOME INSECURITY: IN THE LAST 12 MONTHS, WAS THERE A TIME WHEN YOU WERE NOT ABLE TO PAY THE MORTGAGE OR RENT ON TIME?: PATIENT DECLINED

## 2025-01-31 SDOH — ECONOMIC STABILITY: FOOD INSECURITY: WITHIN THE PAST 12 MONTHS, YOU WORRIED THAT YOUR FOOD WOULD RUN OUT BEFORE YOU GOT MONEY TO BUY MORE.: PATIENT DECLINED

## 2025-01-31 SDOH — ECONOMIC STABILITY: TRANSPORTATION INSECURITY
IN THE PAST 12 MONTHS, HAS THE LACK OF TRANSPORTATION KEPT YOU FROM MEDICAL APPOINTMENTS OR FROM GETTING MEDICATIONS?: PATIENT DECLINED

## 2025-02-03 ENCOUNTER — OFFICE VISIT (OUTPATIENT)
Facility: CLINIC | Age: 75
End: 2025-02-03

## 2025-02-03 VITALS
DIASTOLIC BLOOD PRESSURE: 84 MMHG | OXYGEN SATURATION: 96 % | HEIGHT: 67 IN | WEIGHT: 179 LBS | BODY MASS INDEX: 28.09 KG/M2 | TEMPERATURE: 97.6 F | SYSTOLIC BLOOD PRESSURE: 138 MMHG | HEART RATE: 61 BPM

## 2025-02-03 DIAGNOSIS — Z13.39 ALCOHOL SCREENING: ICD-10-CM

## 2025-02-03 DIAGNOSIS — M15.0 PRIMARY OSTEOARTHRITIS INVOLVING MULTIPLE JOINTS: ICD-10-CM

## 2025-02-03 DIAGNOSIS — E78.2 MIXED HYPERLIPIDEMIA: ICD-10-CM

## 2025-02-03 DIAGNOSIS — E55.9 VITAMIN D DEFICIENCY: ICD-10-CM

## 2025-02-03 DIAGNOSIS — I70.90 ASVD (ARTERIOSCLEROTIC VASCULAR DISEASE): ICD-10-CM

## 2025-02-03 DIAGNOSIS — C61 PROSTATE CARCINOMA (HCC): ICD-10-CM

## 2025-02-03 DIAGNOSIS — I10 ESSENTIAL HYPERTENSION: Primary | ICD-10-CM

## 2025-02-03 DIAGNOSIS — Z00.00 MEDICARE ANNUAL WELLNESS VISIT, SUBSEQUENT: ICD-10-CM

## 2025-02-03 DIAGNOSIS — Z87.891 HISTORY OF TOBACCO ABUSE: ICD-10-CM

## 2025-02-03 DIAGNOSIS — R73.02 GLUCOSE INTOLERANCE (IMPAIRED GLUCOSE TOLERANCE): ICD-10-CM

## 2025-02-03 RX ORDER — NIACIN 500 MG/1
500 TABLET, EXTENDED RELEASE ORAL NIGHTLY
Qty: 90 TABLET | Refills: 3 | Status: SHIPPED | OUTPATIENT
Start: 2025-02-03 | End: 2025-02-03 | Stop reason: SDUPTHER

## 2025-02-03 RX ORDER — NIACIN 500 MG/1
500 TABLET, EXTENDED RELEASE ORAL NIGHTLY
Qty: 90 TABLET | Refills: 3 | Status: SHIPPED | OUTPATIENT
Start: 2025-02-03

## 2025-02-03 NOTE — PROGRESS NOTES
\"Have you been to the ER, urgent care clinic since your last visit?  Hospitalized since your last visit?\"    YES - When: approximately 3  months  ago.  Where and Why: Pt went to [facility] for right knee pain and cortisone injection.    “Have you seen or consulted any other health care providers outside our system since your last visit?”    NO        
answered.  Lifestyle recommendations and modifications discussed and made.  Follow-up again 3 months or sooner if there is a problem.  I will call with lab results.    PLAN:  Orders Placed This Encounter   Procedures    Vitamin D 25 Hydroxy     Standing Status:   Future     Standing Expiration Date:   1/31/2026    Hemoglobin A1C     Standing Status:   Future     Standing Expiration Date:   1/31/2026    Urinalysis with Microscopic     Standing Status:   Future     Standing Expiration Date:   1/31/2026     Order Specific Question:   SPECIFY(EX-CATH,MIDSTREAM,CYSTO,ETC)?     Answer:   midstream    TSH     Standing Status:   Future     Standing Expiration Date:   1/31/2026    T4, Free     Standing Status:   Future     Standing Expiration Date:   1/31/2026    Lipid Panel     Standing Status:   Future     Standing Expiration Date:   1/31/2026     Order Specific Question:   Is Patient Fasting?/# of Hours     Answer:   8     Order Specific Question:   Has the patient fasted?     Answer:   Yes    CK     Standing Status:   Future     Standing Expiration Date:   1/31/2026    Comprehensive Metabolic Panel     Standing Status:   Future     Standing Expiration Date:   1/31/2026    CBC with Auto Differential     Standing Status:   Future     Standing Expiration Date:   1/31/2026    PSA Screening     Standing Status:   Future     Standing Expiration Date:   1/31/2026    VT Brief alcohol misuse  (8-15 minutes) []          ATTENTION:   This medical record was transcribed using an electronic medical records system.  Although proofread, it may and can contain electronic and spelling errors.  Other human spelling and other errors may be present.  Corrections may be executed at a later time.  Please feel free to contact us for any clarifications as needed.      No follow-up provider specified.      Morris Burnett MD    Recommended healthy diet low in carbohydrates, fats, sodium and cholesterol.  Recommended regular

## 2025-02-03 NOTE — PATIENT INSTRUCTIONS
Learning About Being Active as an Older Adult  Why is being active important as you get older?     Being active is one of the best things you can do for your health. And it's never too late to start. Being active--or getting active, if you aren't already--has definite benefits. It can:  Give you more energy,  Keep your mind sharp.  Improve balance to reduce your risk of falls.  Help you manage chronic illness with fewer medicines.  No matter how old you are, how fit you are, or what health problems you have, there is a form of activity that will work for you. And the more physical activity you can do, the better your overall health will be.  What kinds of activity can help you stay healthy?  Being more active will make your daily activities easier. Physical activity includes planned exercise and things you do in daily life. There are four types of activity:  Aerobic.  Doing aerobic activity makes your heart and lungs strong.  Includes walking, dancing, and gardening.  Aim for at least 2½ hours spread throughout the week.  It improves your energy and can help you sleep better.  Muscle-strengthening.  This type of activity can help maintain muscle and strengthen bones.  Includes climbing stairs, using resistance bands, and lifting or carrying heavy loads.  Aim for at least twice a week.  It can help protect the knees and other joints.  Stretching.  Stretching gives you better range of motion in joints and muscles.  Includes upper arm stretches, calf stretches, and gentle yoga.  Aim for at least twice a week, preferably after your muscles are warmed up from other activities.  It can help you function better in daily life.  Balancing.  This helps you stay coordinated and have good posture.  Includes heel-to-toe walking, brandon chi, and certain types of yoga.  Aim for at least 3 days a week.  It can reduce your risk of falling.  Even if you have a hard time meeting the recommendations, it's better to be more active

## 2025-02-04 LAB
25(OH)D3 SERPL-MCNC: 52.4 NG/ML (ref 30–100)
ALBUMIN SERPL-MCNC: 3.7 G/DL (ref 3.5–5)
ALBUMIN/GLOB SERPL: 1 (ref 1.1–2.2)
ALP SERPL-CCNC: 84 U/L (ref 45–117)
ALT SERPL-CCNC: 58 U/L (ref 12–78)
ANION GAP SERPL CALC-SCNC: 8 MMOL/L (ref 2–12)
APPEARANCE UR: CLEAR
AST SERPL-CCNC: 31 U/L (ref 15–37)
BACTERIA URNS QL MICRO: NEGATIVE /HPF
BASOPHILS # BLD: 0.02 K/UL (ref 0–0.1)
BASOPHILS NFR BLD: 0.2 % (ref 0–1)
BILIRUB SERPL-MCNC: 0.6 MG/DL (ref 0.2–1)
BILIRUB UR QL: NEGATIVE
BUN SERPL-MCNC: 17 MG/DL (ref 6–20)
BUN/CREAT SERPL: 20 (ref 12–20)
CALCIUM SERPL-MCNC: 9.8 MG/DL (ref 8.5–10.1)
CHLORIDE SERPL-SCNC: 104 MMOL/L (ref 97–108)
CHOLEST SERPL-MCNC: 156 MG/DL
CK SERPL-CCNC: 78 U/L (ref 39–308)
CO2 SERPL-SCNC: 23 MMOL/L (ref 21–32)
COLOR UR: NORMAL
CREAT SERPL-MCNC: 0.84 MG/DL (ref 0.7–1.3)
DIFFERENTIAL METHOD BLD: NORMAL
EOSINOPHIL # BLD: 0.14 K/UL (ref 0–0.4)
EOSINOPHIL NFR BLD: 1.5 % (ref 0–7)
EPITH CASTS URNS QL MICRO: NORMAL /LPF
ERYTHROCYTE [DISTWIDTH] IN BLOOD BY AUTOMATED COUNT: 14 % (ref 11.5–14.5)
EST. AVERAGE GLUCOSE BLD GHB EST-MCNC: 134 MG/DL
GLOBULIN SER CALC-MCNC: 3.6 G/DL (ref 2–4)
GLUCOSE SERPL-MCNC: 107 MG/DL (ref 65–100)
GLUCOSE UR STRIP.AUTO-MCNC: NEGATIVE MG/DL
HBA1C MFR BLD: 6.3 % (ref 4–5.6)
HCT VFR BLD AUTO: 45.5 % (ref 36.6–50.3)
HDLC SERPL-MCNC: 42 MG/DL
HDLC SERPL: 3.7 (ref 0–5)
HGB BLD-MCNC: 14.2 G/DL (ref 12.1–17)
HGB UR QL STRIP: NEGATIVE
HYALINE CASTS URNS QL MICRO: NORMAL /LPF (ref 0–5)
IMM GRANULOCYTES # BLD AUTO: 0.03 K/UL (ref 0–0.04)
IMM GRANULOCYTES NFR BLD AUTO: 0.3 % (ref 0–0.5)
KETONES UR QL STRIP.AUTO: NEGATIVE MG/DL
LDLC SERPL CALC-MCNC: 90.6 MG/DL (ref 0–100)
LEUKOCYTE ESTERASE UR QL STRIP.AUTO: NEGATIVE
LYMPHOCYTES # BLD: 2.23 K/UL (ref 0.8–3.5)
LYMPHOCYTES NFR BLD: 24.3 % (ref 12–49)
MCH RBC QN AUTO: 28.9 PG (ref 26–34)
MCHC RBC AUTO-ENTMCNC: 31.2 G/DL (ref 30–36.5)
MCV RBC AUTO: 92.5 FL (ref 80–99)
MONOCYTES # BLD: 0.65 K/UL (ref 0–1)
MONOCYTES NFR BLD: 7.1 % (ref 5–13)
NEUTS SEG # BLD: 6.12 K/UL (ref 1.8–8)
NEUTS SEG NFR BLD: 66.6 % (ref 32–75)
NITRITE UR QL STRIP.AUTO: NEGATIVE
NRBC # BLD: 0 K/UL (ref 0–0.01)
NRBC BLD-RTO: 0 PER 100 WBC
PH UR STRIP: 6 (ref 5–8)
PLATELET # BLD AUTO: 276 K/UL (ref 150–400)
PMV BLD AUTO: 9.7 FL (ref 8.9–12.9)
POTASSIUM SERPL-SCNC: 4.7 MMOL/L (ref 3.5–5.1)
PROT SERPL-MCNC: 7.3 G/DL (ref 6.4–8.2)
PROT UR STRIP-MCNC: NEGATIVE MG/DL
PSA SERPL-MCNC: 0 NG/ML (ref 0.01–4)
RBC # BLD AUTO: 4.92 M/UL (ref 4.1–5.7)
RBC #/AREA URNS HPF: NORMAL /HPF (ref 0–5)
SODIUM SERPL-SCNC: 135 MMOL/L (ref 136–145)
SP GR UR REFRACTOMETRY: 1.01 (ref 1–1.03)
T4 FREE SERPL-MCNC: 1.1 NG/DL (ref 0.8–1.5)
TRIGL SERPL-MCNC: 117 MG/DL
TSH SERPL DL<=0.05 MIU/L-ACNC: 3.19 UIU/ML (ref 0.36–3.74)
UROBILINOGEN UR QL STRIP.AUTO: 0.2 EU/DL (ref 0.2–1)
VLDLC SERPL CALC-MCNC: 23.4 MG/DL
WBC # BLD AUTO: 9.2 K/UL (ref 4.1–11.1)
WBC URNS QL MICRO: NORMAL /HPF (ref 0–4)